# Patient Record
Sex: MALE | Race: ASIAN | Employment: FULL TIME | URBAN - METROPOLITAN AREA
[De-identification: names, ages, dates, MRNs, and addresses within clinical notes are randomized per-mention and may not be internally consistent; named-entity substitution may affect disease eponyms.]

---

## 2017-01-03 ENCOUNTER — TELEPHONE (OUTPATIENT)
Dept: FAMILY MEDICINE CLINIC | Facility: CLINIC | Age: 37
End: 2017-01-03

## 2017-01-03 ENCOUNTER — MED REC SCAN ONLY (OUTPATIENT)
Dept: FAMILY MEDICINE CLINIC | Facility: CLINIC | Age: 37
End: 2017-01-03

## 2017-05-17 ENCOUNTER — TELEPHONE (OUTPATIENT)
Dept: FAMILY MEDICINE CLINIC | Facility: CLINIC | Age: 37
End: 2017-05-17

## 2017-05-17 NOTE — TELEPHONE ENCOUNTER
Patient wants to know who we recommend for spinal doctor - he has a spine and neck issue. Please advise. Patient would like a call back today.

## 2017-05-17 NOTE — TELEPHONE ENCOUNTER
LOV 12/16 Shoulder pain ,neck arthritis x 3 days. Back pain since Friday says that has been pretty bad. Advised to continue heat or ice. Take OTC pain Rx  until seen by PCP.      Future Appointments  Date Time Provider Saurabh Wade   5/18/2017 12:30 PM

## 2017-05-18 ENCOUNTER — OFFICE VISIT (OUTPATIENT)
Dept: FAMILY MEDICINE CLINIC | Facility: CLINIC | Age: 37
End: 2017-05-18

## 2017-05-18 ENCOUNTER — TELEPHONE (OUTPATIENT)
Dept: FAMILY MEDICINE CLINIC | Facility: CLINIC | Age: 37
End: 2017-05-18

## 2017-05-18 VITALS
HEART RATE: 56 BPM | RESPIRATION RATE: 20 BRPM | DIASTOLIC BLOOD PRESSURE: 78 MMHG | TEMPERATURE: 99 F | OXYGEN SATURATION: 99 % | SYSTOLIC BLOOD PRESSURE: 118 MMHG | BODY MASS INDEX: 24.7 KG/M2 | HEIGHT: 67.5 IN | WEIGHT: 159.25 LBS

## 2017-05-18 DIAGNOSIS — M62.838 MUSCLE SPASMS OF NECK: ICD-10-CM

## 2017-05-18 DIAGNOSIS — M62.830 SPASM OF MUSCLE, BACK: ICD-10-CM

## 2017-05-18 DIAGNOSIS — M54.2 NECK PAIN: Primary | ICD-10-CM

## 2017-05-18 DIAGNOSIS — M50.30 DEGENERATIVE DISC DISEASE, CERVICAL: ICD-10-CM

## 2017-05-18 PROCEDURE — 99213 OFFICE O/P EST LOW 20 MIN: CPT | Performed by: FAMILY MEDICINE

## 2017-05-18 RX ORDER — NAPROXEN 500 MG/1
500 TABLET ORAL 2 TIMES DAILY WITH MEALS
Qty: 30 TABLET | Refills: 0 | Status: SHIPPED | OUTPATIENT
Start: 2017-05-18 | End: 2017-06-02

## 2017-05-18 RX ORDER — CYCLOBENZAPRINE HCL 10 MG
10 TABLET ORAL NIGHTLY
Qty: 15 TABLET | Refills: 0 | Status: SHIPPED | OUTPATIENT
Start: 2017-05-18 | End: 2017-06-02

## 2017-05-18 NOTE — TELEPHONE ENCOUNTER
Patient's wife called at 8:39 to cancel his appointment for today at 12:30. This will count as a No Show due to late cancellation. First No Show this year.

## 2017-05-18 NOTE — PROGRESS NOTES
Mekhi Matthew is a 31year old male. Patient presents with:  Pain: Back,shoulder and neck pain. Started in back then pain started in shoulder and neck pain.     HPI:   Patient complaining of pain in the neck and right shoulder area for the past few days, p PLAN:   Hortensia Hollingsworth was seen today for pain. Diagnoses and all orders for this visit:    Neck pain  -     Physical Therapy Referral - [facility]  -     naproxen 500 MG Oral Tab; Take 1 tablet (500 mg total) by mouth 2 (two) times daily with meals.     Renay Dawn

## 2017-05-18 NOTE — PATIENT INSTRUCTIONS
Shoulder and Upper Back Stretch  To start, stand tall with your ears, shoulders, and hips in line. Your feet should be slightly apart, positioned just under your hips. Focus your eyes directly in front of you.   this position for a few seconds bef To start, lie on your back, knees bent and feet flat on the floor. Keep your ears, shoulders, and hips aligned, but don’t press your lower back to the floor. Rest your hands on your pelvis. Breathe deeply and relax.   · With your neck relaxed, place t

## 2017-05-22 ENCOUNTER — TELEPHONE (OUTPATIENT)
Dept: FAMILY MEDICINE CLINIC | Facility: CLINIC | Age: 37
End: 2017-05-22

## 2017-05-22 ENCOUNTER — MED REC SCAN ONLY (OUTPATIENT)
Dept: FAMILY MEDICINE CLINIC | Facility: CLINIC | Age: 37
End: 2017-05-22

## 2017-05-22 NOTE — TELEPHONE ENCOUNTER
Received fax from [facility] of patient's initial evaluation. Asked to review, sign, and return. Placed on Dr Rao Wholesaleileen.

## 2017-06-17 ENCOUNTER — OFFICE VISIT (OUTPATIENT)
Dept: FAMILY MEDICINE CLINIC | Facility: CLINIC | Age: 37
End: 2017-06-17

## 2017-06-17 VITALS
OXYGEN SATURATION: 98 % | HEART RATE: 66 BPM | HEIGHT: 67.5 IN | WEIGHT: 159 LBS | RESPIRATION RATE: 18 BRPM | TEMPERATURE: 98 F | DIASTOLIC BLOOD PRESSURE: 68 MMHG | BODY MASS INDEX: 24.66 KG/M2 | SYSTOLIC BLOOD PRESSURE: 112 MMHG

## 2017-06-17 DIAGNOSIS — M50.30 DEGENERATIVE DISC DISEASE, CERVICAL: Primary | ICD-10-CM

## 2017-06-17 DIAGNOSIS — M79.2 RADICULAR PAIN IN RIGHT ARM: ICD-10-CM

## 2017-06-17 PROCEDURE — 99213 OFFICE O/P EST LOW 20 MIN: CPT | Performed by: FAMILY MEDICINE

## 2017-06-17 NOTE — PATIENT INSTRUCTIONS
Neck Problems: Relieving Your Symptoms  The first goal of treatment is to relieve your symptoms. Your healthcare provider may recommend self-care treatments. These include resting, applying ice and heat, taking medicine, and doing exercises.  Your healthc · Joint mobilization. The PT gently moves your vertebrae to help restore motion in your neck joints and reduce neck pain. · Soft tissue mobilization. The PT massages and stretches the muscles in your neck and shoulders.   · Electrical stimulation. Electric

## 2017-06-17 NOTE — PROGRESS NOTES
Ida Daugherty is a 31year old male. Patient presents with: Follow - Up: Pt states he is feeling much better from the back pain, bottom of head feels numb as times and numbness in hands as well.     HPI:   Patient is seen for follow-up of pain in the neck paraspinal muscles, normal ROM.   LUNGS: clear to auscultation  CARDIO: RRR without murmur  NEURO: Bilateral upper extremities normal sensation, strength is 5 x 5 bilateral, DTRs 2+ and symmetrical bilateral.    ASSESSMENT AND PLAN:   Sarai Muro was seen today f

## 2017-06-26 ENCOUNTER — OFFICE VISIT (OUTPATIENT)
Dept: SURGERY | Facility: CLINIC | Age: 37
End: 2017-06-26

## 2017-06-26 VITALS — DIASTOLIC BLOOD PRESSURE: 60 MMHG | HEART RATE: 64 BPM | SYSTOLIC BLOOD PRESSURE: 100 MMHG

## 2017-06-26 DIAGNOSIS — M50.30 DDD (DEGENERATIVE DISC DISEASE), CERVICAL: ICD-10-CM

## 2017-06-26 DIAGNOSIS — M54.2 CERVICALGIA OF OCCIPITO-ATLANTO-AXIAL REGION: ICD-10-CM

## 2017-06-26 DIAGNOSIS — M54.12 CERVICAL RADICULOPATHY: Primary | ICD-10-CM

## 2017-06-26 PROCEDURE — 99204 OFFICE O/P NEW MOD 45 MIN: CPT | Performed by: NURSE PRACTITIONER

## 2017-06-26 NOTE — H&P
NEUROSURGERY CLINIC VISIT    Ida Daugherty  12/21/1980      Patient presents with:  Neck Pain: NP referred by Dr. Shayy Bray        HPI:   Ida Daugherty is a 31year old ri 100/60 (BP Location: Right arm, Patient Position: Sitting, Cuff Size: adult)   Pulse 64   GENERAL:  Patient is in no acute distress. HEENT:  Normocephalic, atraumatic  SKIN: Warm, dry, no rashes or scars.   RESPIRATORY: easy and even  NEUROLOGICAL:  This p diagnosis, treatment options, and expectations . Recommend he get MRI is over the past 2 weeks he has new onset of symptoms to the right hand, middle, and ring finger.   Advised him we will get this MRI and discuss at his next office visit. surgical option

## 2017-06-26 NOTE — PATIENT INSTRUCTIONS
Refill policies:    • Allow 2-3 business days for refills; controlled substances may take longer.   • Contact your pharmacy at least 5 days prior to running out of medication and have them send an electronic request or submit request through the [facility] have a procedure or additional testing performed. [facility]) will contact your insurance carrier to obtain pre-certification or prior authorization.     Unfortunately, [employer] has seen an increase in denial of payment even though the p

## 2017-06-26 NOTE — PROGRESS NOTES
Location of Pain: neck, shoulder, radiating into right arm with numbness and tingling in right ring finger as well in the palm of the hand.     Date Pain Began: neck pain yrs, radiating into hand 1 month          Work Related:   No        Receiving Work Com

## 2017-07-06 ENCOUNTER — TELEPHONE (OUTPATIENT)
Dept: SURGERY | Facility: CLINIC | Age: 37
End: 2017-07-06

## 2017-07-07 ENCOUNTER — TELEPHONE (OUTPATIENT)
Dept: SURGERY | Facility: CLINIC | Age: 37
End: 2017-07-07

## 2017-07-07 NOTE — TELEPHONE ENCOUNTER
Patient states he never received call from our office stating MRI had been approved so he has not completed study. His symptoms have improved since last visit and he is wondering if he still needs MRI done.  Informed him that we forgot to place order for MR

## 2017-07-10 ENCOUNTER — TELEPHONE (OUTPATIENT)
Dept: SURGERY | Facility: CLINIC | Age: 37
End: 2017-07-10

## 2017-07-10 ENCOUNTER — OFFICE VISIT (OUTPATIENT)
Dept: SURGERY | Facility: CLINIC | Age: 37
End: 2017-07-10

## 2017-07-10 VITALS
RESPIRATION RATE: 12 BRPM | WEIGHT: 159 LBS | HEART RATE: 68 BPM | DIASTOLIC BLOOD PRESSURE: 68 MMHG | BODY MASS INDEX: 25 KG/M2 | SYSTOLIC BLOOD PRESSURE: 112 MMHG

## 2017-07-10 DIAGNOSIS — M50.30 DDD (DEGENERATIVE DISC DISEASE), CERVICAL: ICD-10-CM

## 2017-07-10 DIAGNOSIS — M54.12 CERVICAL RADICULOPATHY: Primary | ICD-10-CM

## 2017-07-10 PROCEDURE — 99213 OFFICE O/P EST LOW 20 MIN: CPT | Performed by: NURSE PRACTITIONER

## 2017-07-10 NOTE — PATIENT INSTRUCTIONS
Refill policies:    • Allow 2-3 business days for refills; controlled substances may take longer.   • Contact your pharmacy at least 5 days prior to running out of medication and have them send an electronic request or submit request through the [facility] have a procedure or additional testing performed. [facility]) will contact your insurance carrier to obtain pre-certification or prior authorization.     Unfortunately, [employer] has seen an increase in denial of payment even though the p

## 2017-07-10 NOTE — PROGRESS NOTES
Neurosurgery Cervical  Follow up      [facility] PCP:  Evita White MD    1980 MRN [id number]         Patient presents with:  Neck Pain    REASON FOR VISIT: f/u    HISTORY OF PRESENT Ga Gaby is a 31year oldmale Here for f/ MRI ordered for him to have done if his pain worsens. Advised him that if he has weakness or worsening pain to call our office in follow-up     Imaging and anatomy were reviewed with the patient and explained in detail.  Dr Melvi Alcala discussed diagnosis, treat

## 2017-07-10 NOTE — TELEPHONE ENCOUNTER
This patient came in today for office visit and was asking about his films that we had brought down to radiology to scan. He has not yet received these films could you call radiology and see if they have mailed them to him.

## 2017-12-15 ENCOUNTER — TELEPHONE (OUTPATIENT)
Dept: FAMILY MEDICINE CLINIC | Facility: CLINIC | Age: 37
End: 2017-12-15

## 2017-12-15 NOTE — TELEPHONE ENCOUNTER
Spoke with pt, he was seeing Dr Deysi Azevedo (neuro) and neck was improving    Pt just wanted to see PT, have the report sent to Dr Scott Mackay sign off on.     Explained that Dr would need to see him first    Gave him # to Dr Rose Valenzuela also    Pt will see if Dr Deysi Azevedo w

## 2017-12-15 NOTE — TELEPHONE ENCOUNTER
Has neck pain and [facility] going to [facility]. Also requested a physical before the end of the year. [facility] not [facility] here and pt declined physical mid-January. [employer] but plan has changed.   Pt believes it is still

## 2017-12-22 ENCOUNTER — TELEPHONE (OUTPATIENT)
Dept: SURGERY | Facility: CLINIC | Age: 37
End: 2017-12-22

## 2017-12-22 NOTE — TELEPHONE ENCOUNTER
Spoke with patient, informed him that he would need to make appointment with another provider to get examined prior to ordering any PT.  Patient wants to know if order can be based on last visit notes, I told him it has been over 5 months and things can [place]

## 2018-04-26 ENCOUNTER — OFFICE VISIT (OUTPATIENT)
Dept: FAMILY MEDICINE CLINIC | Facility: CLINIC | Age: 38
End: 2018-04-26

## 2018-04-26 ENCOUNTER — TELEPHONE (OUTPATIENT)
Dept: FAMILY MEDICINE CLINIC | Facility: CLINIC | Age: 38
End: 2018-04-26

## 2018-04-26 VITALS
WEIGHT: 150.5 LBS | HEART RATE: 54 BPM | RESPIRATION RATE: 14 BRPM | DIASTOLIC BLOOD PRESSURE: 58 MMHG | OXYGEN SATURATION: 98 % | BODY MASS INDEX: 23.35 KG/M2 | HEIGHT: 67.5 IN | SYSTOLIC BLOOD PRESSURE: 112 MMHG | TEMPERATURE: 99 F

## 2018-04-26 DIAGNOSIS — M54.12 CERVICAL RADICULOPATHY: ICD-10-CM

## 2018-04-26 DIAGNOSIS — M50.30 DEGENERATIVE DISC DISEASE, CERVICAL: ICD-10-CM

## 2018-04-26 DIAGNOSIS — L40.9 PSORIASIS OF SCALP: ICD-10-CM

## 2018-04-26 DIAGNOSIS — Z00.00 ROUTINE GENERAL MEDICAL EXAMINATION AT A HEALTH CARE FACILITY: Primary | ICD-10-CM

## 2018-04-26 DIAGNOSIS — L85.3 XEROSIS OF SKIN: ICD-10-CM

## 2018-04-26 PROCEDURE — 99395 PREV VISIT EST AGE 18-39: CPT | Performed by: FAMILY MEDICINE

## 2018-04-26 PROCEDURE — 99213 OFFICE O/P EST LOW 20 MIN: CPT | Performed by: FAMILY MEDICINE

## 2018-04-26 NOTE — PATIENT INSTRUCTIONS
Please apply hydrocortisone 1% cream on your ear twice daily for dryness. Prevention Guidelines, Men Ages 25 to 44  Screening tests and vaccines are an important part of managing your health. Health counseling is essential, too.  Below are guidelines for your healthcare provider 3 doses over 6 months; second dose should be given 1 month after the first dose; the third dose should be given at least 2 months after the second dose and at least 4 months after the first dose   Haemophilus influenzae Type B (HIB   [address], [place], [address]. All rights reserved. This information is not intended as a substitute for professional medical care. Always follow your healthcare professional's instructions.

## 2018-04-26 NOTE — PROGRESS NOTES
Silvia Fournier is a 30year old male here for Patient presents with: Well Adult: Physical.    HPI:   Patient is seen for a physical    Patient states is following up with Dr. Melvi Alcala, neurosurgeon. States he is no longer with the practice.   Was advised phys cough, shortness of breath, dyspnea on exertion, wheezing, hemoptysis. Cardiovascular: No heart palpitations, irregular heartbeat, faintness or syncope, no chest pressure or chest pain on exertion. No edema or varicose veins.   GI: No change in appetite, h Extremities:   Extremities normal, atraumatic, no cyanosis or edema   Pulses:   2+ and symmetric all extremities   Skin:    Male pattern baldness, dry patches with flakes on scalp   Lymph nodes:   Cervical, supraclavicular, and axillary nodes normal   Ne

## 2018-04-27 RX ORDER — CLOBETASOL PROPIONATE 0.5 MG/G
1 AEROSOL, FOAM TOPICAL 2 TIMES DAILY
Qty: 100 G | Refills: 0 | Status: SHIPPED | OUTPATIENT
Start: 2018-04-27 | End: 2018-05-27

## 2018-10-31 ENCOUNTER — OFFICE VISIT (OUTPATIENT)
Dept: FAMILY MEDICINE CLINIC | Facility: CLINIC | Age: 38
End: 2018-10-31
Payer: COMMERCIAL

## 2018-10-31 VITALS
SYSTOLIC BLOOD PRESSURE: 116 MMHG | WEIGHT: 148.38 LBS | DIASTOLIC BLOOD PRESSURE: 70 MMHG | BODY MASS INDEX: 23.02 KG/M2 | RESPIRATION RATE: 16 BRPM | TEMPERATURE: 97 F | HEIGHT: 67.5 IN | OXYGEN SATURATION: 98 % | HEART RATE: 68 BPM

## 2018-10-31 DIAGNOSIS — B07.0 PLANTAR WART OF RIGHT FOOT: Primary | ICD-10-CM

## 2018-10-31 DIAGNOSIS — M50.30 DEGENERATIVE DISC DISEASE, CERVICAL: ICD-10-CM

## 2018-10-31 DIAGNOSIS — M54.2 CERVICALGIA: ICD-10-CM

## 2018-10-31 PROCEDURE — 99213 OFFICE O/P EST LOW 20 MIN: CPT | Performed by: FAMILY MEDICINE

## 2018-10-31 NOTE — PROGRESS NOTES
Anmol John is a 30year old male. Patient presents with: Foot Pain: Pt here for callus on right foot not getting better.   Neck Pain: States his neck patient is coming back would like another order for PT    HPI:   Patient complaining of painful callus REHAB

## 2018-10-31 NOTE — PATIENT INSTRUCTIONS
Neck Problems: Relieving Your Symptoms    The first goal of treatment is to relieve your symptoms. Your healthcare provider may recommend self-care treatments. These include resting, applying ice and heat, taking medicine, and doing exercises.  Your healt your vertebrae to help restore motion in your neck joints and reduce neck pain. · Soft tissue mobilization. The PT massages and stretches the muscles in your neck and shoulders. · Electrical stimulation. Electrical impulses are sent into your neck.  This

## 2018-11-01 ENCOUNTER — TELEPHONE (OUTPATIENT)
Dept: FAMILY MEDICINE CLINIC | Facility: CLINIC | Age: 38
End: 2018-11-01

## 2018-11-01 NOTE — TELEPHONE ENCOUNTER
PODIATRIST MARCIA OrozcoM none none     Would like to see Dr Katerin Zapata vs. this Dr/specialist. ??  Unsure why. Sounded like a clinical discussion. Pls Call.

## 2018-11-02 ENCOUNTER — PATIENT MESSAGE (OUTPATIENT)
Dept: FAMILY MEDICINE CLINIC | Facility: CLINIC | Age: 38
End: 2018-11-02

## 2018-11-02 NOTE — TELEPHONE ENCOUNTER
From: Mo Tan  To: Laron Hylton MD  Sent: 11/2/2018 1:05 PM CDT  Subject: Visit Follow-up Question    Hi Doc,     I checked on the copay for specialist it is almost twice than the pcp.  Was wanting to check if you will take up my case for the fo

## 2018-12-17 ENCOUNTER — OFFICE VISIT (OUTPATIENT)
Dept: FAMILY MEDICINE CLINIC | Facility: CLINIC | Age: 38
End: 2018-12-17
Payer: COMMERCIAL

## 2018-12-17 VITALS
HEART RATE: 74 BPM | DIASTOLIC BLOOD PRESSURE: 86 MMHG | SYSTOLIC BLOOD PRESSURE: 136 MMHG | OXYGEN SATURATION: 98 % | WEIGHT: 150.5 LBS | BODY MASS INDEX: 23 KG/M2 | TEMPERATURE: 97 F | RESPIRATION RATE: 16 BRPM

## 2018-12-17 DIAGNOSIS — B07.0 PLANTAR WART OF RIGHT FOOT: Primary | ICD-10-CM

## 2018-12-17 PROCEDURE — 99212 OFFICE O/P EST SF 10 MIN: CPT | Performed by: FAMILY MEDICINE

## 2018-12-17 NOTE — PATIENT INSTRUCTIONS
Understanding Plantar Warts    A plantar wart is a small noncancerous growth on the bottom of the foot. Plantar warts often develop where friction or pressure occurs, such as on the ball of the foot. The word plantar refers to the sole of the foot.  Inessa these other treatments.    When should I call my healthcare provider? Call your healthcare provider if you have plantar warts that become too painful and do not go away on their own or with over-the-counter and at home treatments.    Date Last Reviewed: 3/

## 2018-12-24 NOTE — PROGRESS NOTES
Dominik Diallo is a 41year old male. Patient presents with: Follow - Up: Pt here for f/u of wart on right foot has been using a medication that was given in [facility]    HPI:   Patient is seen for follow-up of her right foot plantar wart.   States was visitin

## 2019-06-11 ENCOUNTER — HOSPITAL ENCOUNTER (OUTPATIENT)
Dept: PHYSICAL THERAPY | Facility: HOSPITAL | Age: 39
Setting detail: THERAPIES SERIES
Discharge: HOME | End: 2019-06-11
Attending: FAMILY MEDICINE
Payer: COMMERCIAL

## 2019-06-11 PROCEDURE — 97110 THERAPEUTIC EXERCISES: CPT

## 2019-06-11 PROCEDURE — 97161 PT EVAL LOW COMPLEX 20 MIN: CPT

## 2019-06-12 NOTE — PROGRESS NOTES
SPINE EVALUATION:   Referring Physician: Dr. Doyle Siu  Diagnosis: cervical DDD, neck pain     Date of Service: 6/12/2019     PATIENT SUMMARY   Ita Juarez is a 41year old male who presents to therapy today with complaints of bilateral neck pain describ discussed evaluation findings, pathology, POC and HEP. Pt voiced understanding and performs HEP correctly without reported pain. Skilled Physical Therapy is medically necessary to address the above impairments and reach functional goals.      Precautions: safely within 4 weeks. Patient will tolerate sitting at computer for 2 hours with no increase in pain within 4 weeks. Patient will achieve TD in FOTO score within 4 weeks in order to improve functional mobility.     Frequency / Duration: Patient will be

## 2019-06-14 ENCOUNTER — HOSPITAL ENCOUNTER (OUTPATIENT)
Dept: PHYSICAL THERAPY | Facility: HOSPITAL | Age: 39
Setting detail: THERAPIES SERIES
Discharge: HOME | End: 2019-06-14
Attending: FAMILY MEDICINE
Payer: COMMERCIAL

## 2019-06-14 ENCOUNTER — TELEPHONE (OUTPATIENT)
Dept: FAMILY MEDICINE CLINIC | Facility: CLINIC | Age: 39
End: 2019-06-14

## 2019-06-14 PROCEDURE — 97140 MANUAL THERAPY 1/> REGIONS: CPT

## 2019-06-14 PROCEDURE — 97110 THERAPEUTIC EXERCISES: CPT

## 2019-06-14 NOTE — PROGRESS NOTES
Dx: cervical DDD, neck pain           Authorized # of Visits:  [id number]         Next MD visit: none scheduled  Fall Risk: standard         Precautions: n/a             Subjective: patient reports that his symptoms are about the same.  He did his exercises and had

## 2019-06-14 NOTE — TELEPHONE ENCOUNTER
Pt requesting to sp w/  has some questions after having his 1st physical therapy appt, informed  out of office until 6/24 he said ok for her to call when she returns

## 2019-06-18 ENCOUNTER — HOSPITAL ENCOUNTER (OUTPATIENT)
Dept: PHYSICAL THERAPY | Facility: HOSPITAL | Age: 39
Setting detail: THERAPIES SERIES
Discharge: HOME | End: 2019-06-18
Attending: FAMILY MEDICINE
Payer: COMMERCIAL

## 2019-06-18 PROCEDURE — 97110 THERAPEUTIC EXERCISES: CPT

## 2019-06-18 PROCEDURE — 97140 MANUAL THERAPY 1/> REGIONS: CPT

## 2019-06-18 NOTE — PROGRESS NOTES
Dx: cervical DDD, neck pain           Authorized # of Visits:  [id number]         Next MD visit: none scheduled  Fall Risk: standard         Precautions: n/a             Subjective: patient reports that his neck is feeling better since previous session.  Still comp

## 2019-06-21 ENCOUNTER — HOSPITAL ENCOUNTER (OUTPATIENT)
Dept: PHYSICAL THERAPY | Facility: HOSPITAL | Age: 39
Setting detail: THERAPIES SERIES
Discharge: HOME | End: 2019-06-21
Attending: FAMILY MEDICINE
Payer: COMMERCIAL

## 2019-06-21 PROCEDURE — 97140 MANUAL THERAPY 1/> REGIONS: CPT

## 2019-06-21 PROCEDURE — 97110 THERAPEUTIC EXERCISES: CPT

## 2019-06-21 NOTE — PROGRESS NOTES
Dx: cervical DDD, neck pain           Authorized # of Visits:  [id number]         Next MD visit: none scheduled  Fall Risk: standard         Precautions: n/a             Subjective: patient reports that his neck is feeling better since previous session.  No pain wi

## 2019-06-25 ENCOUNTER — APPOINTMENT (OUTPATIENT)
Dept: PHYSICAL THERAPY | Facility: HOSPITAL | Age: 39
End: 2019-06-25
Attending: FAMILY MEDICINE
Payer: COMMERCIAL

## 2019-06-28 ENCOUNTER — APPOINTMENT (OUTPATIENT)
Dept: PHYSICAL THERAPY | Facility: HOSPITAL | Age: 39
End: 2019-06-28
Attending: FAMILY MEDICINE
Payer: COMMERCIAL

## 2019-07-02 ENCOUNTER — APPOINTMENT (OUTPATIENT)
Dept: PHYSICAL THERAPY | Facility: HOSPITAL | Age: 39
End: 2019-07-02
Attending: FAMILY MEDICINE
Payer: COMMERCIAL

## 2019-07-05 ENCOUNTER — APPOINTMENT (OUTPATIENT)
Dept: PHYSICAL THERAPY | Facility: HOSPITAL | Age: 39
End: 2019-07-05
Attending: FAMILY MEDICINE
Payer: COMMERCIAL

## 2019-07-17 ENCOUNTER — PATIENT MESSAGE (OUTPATIENT)
Dept: FAMILY MEDICINE CLINIC | Facility: CLINIC | Age: 39
End: 2019-07-17

## 2019-07-18 NOTE — TELEPHONE ENCOUNTER
From: Georgina Proctor  To: Alba Moseley MD  Sent: 7/17/2019 7:00 PM CDT  Subject: Visit Follow-up Question    Hello Doctor,    I had some questions from my physical therapy sessions. What is the best way to reach out to you?     Holger Heard

## 2019-07-18 NOTE — TELEPHONE ENCOUNTER
Dr Hu Fields, please see pt's response to my request for an appt    Here is the documentation from June when you were out of the office. The pt never returned our call to get more info.   Dai Potter      6/14/19 1:21 PM   Note      Pt requesting to s

## 2019-07-19 NOTE — TELEPHONE ENCOUNTER
Called and spoke to patient, advised billing issues will have to be addressed with the billing department and PT.

## 2019-08-20 ENCOUNTER — OFFICE VISIT (OUTPATIENT)
Dept: FAMILY MEDICINE CLINIC | Facility: CLINIC | Age: 39
End: 2019-08-20
Payer: COMMERCIAL

## 2019-08-20 VITALS
BODY MASS INDEX: 23.66 KG/M2 | WEIGHT: 152.5 LBS | SYSTOLIC BLOOD PRESSURE: 122 MMHG | TEMPERATURE: 98 F | OXYGEN SATURATION: 99 % | RESPIRATION RATE: 16 BRPM | HEIGHT: 67.5 IN | DIASTOLIC BLOOD PRESSURE: 84 MMHG | HEART RATE: 54 BPM

## 2019-08-20 DIAGNOSIS — Z00.00 ROUTINE GENERAL MEDICAL EXAMINATION AT A HEALTH CARE FACILITY: Primary | ICD-10-CM

## 2019-08-20 DIAGNOSIS — Z78.9 VEGETARIAN DIET: ICD-10-CM

## 2019-08-20 PROCEDURE — 99395 PREV VISIT EST AGE 18-39: CPT | Performed by: FAMILY MEDICINE

## 2019-08-20 NOTE — PROGRESS NOTES
Yenni Pereyra is a 41year old male here for     HPI:   Patient is seen for annual physical.    Chronic neck pain is better now but flares up at times, states physical therapy does help and has been doing the exercises at home.     PAST MEDICAL HISTORY: with urination. Male: No erectile dysfunction or low libido. No testicular or scrotal swelling or tenderness. No nocturia or difficulty with urine stream.  Rheumatologic: No history of autoimmune disorder.  Has chronic neck pain and arthritis in the neck Throughout, normal speech, alert and oriented       ASSESSMENT AND PLAN:   Diagnoses and all orders for this visit:    Routine general medical examination at a health care facility  -     ASSAY, THYROID STIM HORMONE; Future  -     LIPID PANEL;  Future

## 2019-08-20 NOTE — PATIENT INSTRUCTIONS
Prevention Guidelines, Men Ages 25 to 44  Screening tests and vaccines are an important part of managing your health. A screening test is done to find possible disorders or diseases in people who don't have any symptoms.  The goal is to find a disease ear needs it How often   Chickenpox (varicella) All men in this age group who have no record of this infection or vaccine 2 doses; the second dose should be given at least 4 weeks after the first dose   Hepatitis A Men at increased risk for infection – talk wi sexually active At routine exams   Skin cancer Prevention of skin cancer in fair-skinned adults through age 25 At routine exams   1Those who are 25years of age, who are not up-to-date on their childhood immunizations, should receive all appropriate catch-

## 2019-08-22 ENCOUNTER — PATIENT MESSAGE (OUTPATIENT)
Dept: FAMILY MEDICINE CLINIC | Facility: CLINIC | Age: 39
End: 2019-08-22

## 2019-08-22 DIAGNOSIS — M50.30 DEGENERATIVE DISC DISEASE, CERVICAL: Primary | ICD-10-CM

## 2019-08-22 NOTE — TELEPHONE ENCOUNTER
Dr. Dana Jacob,  Please advise    LOV 8/20/19   Routine PE    PT referral order written 10/31/19 for cervical disc disease. Approved for 8 visits through 10/31/19. Started PT 6/12/19 and has been seen for 4 visits.

## 2019-08-22 NOTE — TELEPHONE ENCOUNTER
From: Tom Mclaughlin  To: Keyshawn Wall MD  Sent: 8/22/2019 1:35 PM CDT  Subject: Other    Hello Doctor,    I forgot to ask you to write a letter for physical therapy for my neck. Could you please write me one? I do know it expires in 90 days.     Thank

## 2019-08-26 LAB
ABSOLUTE BASOPHILS: 39 CELLS/UL (ref 0–200)
ABSOLUTE EOSINOPHILS: 193 CELLS/UL (ref 15–500)
ABSOLUTE LYMPHOCYTES: 1551 CELLS/UL (ref 850–3900)
ABSOLUTE MONOCYTES: 413 CELLS/UL (ref 200–950)
ABSOLUTE NEUTROPHILS: 3306 CELLS/UL (ref 1500–7800)
ALBUMIN/GLOBULIN RATIO: 1.9 (CALC) (ref 1–2.5)
ALBUMIN: 4.5 G/DL (ref 3.6–5.1)
ALKALINE PHOSPHATASE: 53 U/L (ref 40–115)
ALT: 17 U/L (ref 9–46)
AST: 20 U/L (ref 10–40)
BASOPHILS: 0.7 %
BILIRUBIN, TOTAL: 1.4 MG/DL (ref 0.2–1.2)
BUN: 9 MG/DL (ref 7–25)
CALCIUM: 9.6 MG/DL (ref 8.6–10.3)
CARBON DIOXIDE: 25 MMOL/L (ref 20–32)
CHLORIDE: 106 MMOL/L (ref 98–110)
CHOL/HDLC RATIO: 4 (CALC)
CHOLESTEROL, TOTAL: 204 MG/DL
CREATININE: 0.88 MG/DL (ref 0.6–1.35)
EGFR IF AFRICN AM: 128 ML/MIN/1.73M2
EGFR IF NONAFRICN AM: 111 ML/MIN/1.73M2
EOSINOPHILS: 3.5 %
GLOBULIN: 2.4 G/DL (CALC) (ref 1.9–3.7)
GLUCOSE: 86 MG/DL (ref 65–99)
HDL CHOLESTEROL: 51 MG/DL
HEMATOCRIT: 44.1 % (ref 38.5–50)
HEMOGLOBIN: 14.7 G/DL (ref 13.2–17.1)
LDL-CHOLESTEROL: 136 MG/DL (CALC)
LYMPHOCYTES: 28.2 %
MCH: 30 PG (ref 27–33)
MCHC: 33.3 G/DL (ref 32–36)
MCV: 90 FL (ref 80–100)
MONOCYTES: 7.5 %
MPV: 10.9 FL (ref 7.5–12.5)
NEUTROPHILS: 60.1 %
NON-HDL CHOLESTEROL: 153 MG/DL (CALC)
PLATELET COUNT: 221 THOUSAND/UL (ref 140–400)
POTASSIUM: 4.5 MMOL/L (ref 3.5–5.3)
PROTEIN, TOTAL: 6.9 G/DL (ref 6.1–8.1)
RDW: 12.4 % (ref 11–15)
RED BLOOD CELL COUNT: 4.9 MILLION/UL (ref 4.2–5.8)
SODIUM: 140 MMOL/L (ref 135–146)
TRIGLYCERIDES: 74 MG/DL
TSH: 1.96 MIU/L (ref 0.4–4.5)
VITAMIN B12: 264 PG/ML (ref 200–1100)
VITAMIN D, 25-OH, TOTAL: 11 NG/ML (ref 30–100)
WHITE BLOOD CELL COUNT: 5.5 THOUSAND/UL (ref 3.8–10.8)

## 2019-10-10 ENCOUNTER — MED REC SCAN ONLY (OUTPATIENT)
Dept: FAMILY MEDICINE CLINIC | Facility: CLINIC | Age: 39
End: 2019-10-10

## 2019-10-21 ENCOUNTER — PATIENT MESSAGE (OUTPATIENT)
Dept: FAMILY MEDICINE CLINIC | Facility: CLINIC | Age: 39
End: 2019-10-21

## 2019-10-22 NOTE — TELEPHONE ENCOUNTER
From: Danny Leggett  To: Bismark Lin MD  Sent: 10/21/2019 7:58 PM CDT  Subject: Prescription Question    Hello Doctor,    Can you please add a refill for vitamin d? I took it for 2 months and the prescription was for 3 months.     Thank you,  Leona Koo

## 2019-12-06 ENCOUNTER — PATIENT MESSAGE (OUTPATIENT)
Dept: FAMILY MEDICINE CLINIC | Facility: CLINIC | Age: 39
End: 2019-12-06

## 2019-12-09 NOTE — TELEPHONE ENCOUNTER
From: Alise Glenvil  To: Stephanie Patiño MD  Sent: 12/6/2019 5:03 PM CST  Subject: Non-Urgent Medical Question    Abigail Hua,    Since last 5 days I feel a little pressure in my head. More around the head right above the ears.  It feels like I've been

## 2019-12-09 NOTE — TELEPHONE ENCOUNTER
VMML for patient and instructed if symptoms acute, experiencing dizziness, nausea, visual changes or any other symptoms, he should go to ED. Advised to call back so we can get condition update and schedule appt as appropriate.

## 2020-07-22 ENCOUNTER — PATIENT MESSAGE (OUTPATIENT)
Dept: FAMILY MEDICINE CLINIC | Facility: CLINIC | Age: 40
End: 2020-07-22

## 2020-07-22 NOTE — TELEPHONE ENCOUNTER
From: Vangie Sigalaing  To: Shaka Sorto MD  Sent: 7/22/2020 4:00 PM CDT  Subject: Test Results Question    Hel Doctor,    I hope you are doing well.     Wanted to check with you if I can get a certificate of the TDAP vaccine I took in 2015 in your offi

## 2020-11-03 ENCOUNTER — OFFICE VISIT (OUTPATIENT)
Dept: FAMILY MEDICINE CLINIC | Facility: CLINIC | Age: 40
End: 2020-11-03
Payer: COMMERCIAL

## 2020-11-03 VITALS
DIASTOLIC BLOOD PRESSURE: 78 MMHG | BODY MASS INDEX: 24.98 KG/M2 | HEART RATE: 92 BPM | RESPIRATION RATE: 18 BRPM | WEIGHT: 161 LBS | OXYGEN SATURATION: 98 % | TEMPERATURE: 98 F | SYSTOLIC BLOOD PRESSURE: 102 MMHG | HEIGHT: 67.5 IN

## 2020-11-03 DIAGNOSIS — M79.646 PAIN OF MIDDLE FINGER: ICD-10-CM

## 2020-11-03 DIAGNOSIS — Z00.00 ROUTINE GENERAL MEDICAL EXAMINATION AT A HEALTH CARE FACILITY: Primary | ICD-10-CM

## 2020-11-03 DIAGNOSIS — L40.9 PSORIASIS: ICD-10-CM

## 2020-11-03 PROCEDURE — 3074F SYST BP LT 130 MM HG: CPT | Performed by: FAMILY MEDICINE

## 2020-11-03 PROCEDURE — 99395 PREV VISIT EST AGE 18-39: CPT | Performed by: FAMILY MEDICINE

## 2020-11-03 PROCEDURE — 3078F DIAST BP <80 MM HG: CPT | Performed by: FAMILY MEDICINE

## 2020-11-03 PROCEDURE — 3008F BODY MASS INDEX DOCD: CPT | Performed by: FAMILY MEDICINE

## 2020-11-03 NOTE — PATIENT INSTRUCTIONS
Prevention Guidelines, Men Ages 25 to 44  Screening tests and vaccines are an important part of managing your health. A screening test is done to find possible disorders or diseases in people who don't have any symptoms.  The goal is to find a disease ear vaccine 2 doses; the second dose should be given at least 4 weeks after the first dose   Hepatitis A Men at increased risk for infection – talk with your healthcare provider 2 doses given at least 6 months apart   Hepatitis B Men at increased risk for infe be reminded to avoid intentional tanning and tanning beds. 1Those who are 25years of age, who are not up-to-date on their childhood immunizations, should get all appropriate catch-up vaccines recommended by the CDC.    Duane last reviewed this educat

## 2020-11-03 NOTE — PROGRESS NOTES
Joel Guy is a 42year old male.   Patient presents with:  Physical    HPI:   Joel Guy is a 42year old male seen for his annual physical.  Eats healthy, is vegetarian    Patient states was taking some homeopathic medicine from [facility] for psoriasis Negative for congestion, ear pain, hearing loss and sore throat. Eyes: Negative for discharge, redness and visual disturbance. Respiratory: Negative for cough, chest tightness and shortness of breath.     Cardiovascular: Negative for chest pain and pal normal. He exhibits no distension and no mass. There is no hepatosplenomegaly. There is no abdominal tenderness. Musculoskeletal: Normal range of motion. He exhibits tenderness (3rd right MCP joint, pain with flexion and extension).    Lymphadenopathy:

## 2021-01-06 ENCOUNTER — PATIENT MESSAGE (OUTPATIENT)
Dept: FAMILY MEDICINE CLINIC | Facility: CLINIC | Age: 41
End: 2021-01-06

## 2021-01-06 DIAGNOSIS — Z78.9 VEGETARIAN DIET: Primary | ICD-10-CM

## 2021-01-06 DIAGNOSIS — E55.9 VITAMIN D DEFICIENCY: ICD-10-CM

## 2021-01-06 DIAGNOSIS — E53.8 VITAMIN B12 DEFICIENCY: ICD-10-CM

## 2021-01-07 NOTE — TELEPHONE ENCOUNTER
LOV 11/3/20  Last vitamin D and B12 8/2019    Ref Range & Units 8/24/19 10:10 AM   VITAMIN D, 25-OH, TOTAL   30 - 100 ng/mL 11Low       Ref Range & Units 8/24/19 10:10 AM   VITAMIN B12   200 - 1,100 pg/mL 264      Lab orders pended for your approval if ok.

## 2021-01-07 NOTE — TELEPHONE ENCOUNTER
From: Dominik Diallo  To: Afshan Rogel MD  Sent: 1/6/2021 11:42 AM CST  Subject: Visit Follow-up Question    Hello Doctor,    From my last annual physical visit, I forgot to ask for my Vitamin levels checked.   Could you please also write me for vitamin

## 2021-01-20 LAB
VITAMIN B12: 322 PG/ML (ref 200–1100)
VITAMIN D, 25-OH, TOTAL: 25 NG/ML (ref 30–100)

## 2021-01-22 ENCOUNTER — PATIENT MESSAGE (OUTPATIENT)
Dept: FAMILY MEDICINE CLINIC | Facility: CLINIC | Age: 41
End: 2021-01-22

## 2021-01-25 NOTE — TELEPHONE ENCOUNTER
From: Ida Daugherty  To: Jojo Vora MD  Sent: 1/22/2021 1:16 PM CST  Subject: Prescription Question    Hi Doctor,    Got your message on test results. Thank you. Will start taking 2000iu for D. I hope it's fine to take 2 tab of 1000iu.  If not then

## 2021-01-25 NOTE — TELEPHONE ENCOUNTER
Viewed by Jessika Potter on 1/22/2021  1:09 PM  Written by Chata Roldan MD on 1/21/2021  5:40 AM  Heath Rutherford,   You have vitamin D insufficiency, please continue to take over the counter vitamin D 2000iu daily.  Vitamin B12 looks normal, please continue to

## 2021-10-08 ENCOUNTER — OFFICE VISIT (OUTPATIENT)
Dept: FAMILY MEDICINE CLINIC | Facility: CLINIC | Age: 41
End: 2021-10-08
Payer: COMMERCIAL

## 2021-10-08 VITALS
WEIGHT: 157 LBS | RESPIRATION RATE: 18 BRPM | OXYGEN SATURATION: 98 % | DIASTOLIC BLOOD PRESSURE: 76 MMHG | TEMPERATURE: 97 F | BODY MASS INDEX: 24.35 KG/M2 | HEART RATE: 60 BPM | HEIGHT: 67.5 IN | SYSTOLIC BLOOD PRESSURE: 102 MMHG

## 2021-10-08 DIAGNOSIS — E53.8 VITAMIN B12 DEFICIENCY: ICD-10-CM

## 2021-10-08 DIAGNOSIS — Z00.00 ROUTINE GENERAL MEDICAL EXAMINATION AT A HEALTH CARE FACILITY: Primary | ICD-10-CM

## 2021-10-08 DIAGNOSIS — E55.9 VITAMIN D DEFICIENCY: ICD-10-CM

## 2021-10-08 PROCEDURE — 3074F SYST BP LT 130 MM HG: CPT | Performed by: FAMILY MEDICINE

## 2021-10-08 PROCEDURE — 3078F DIAST BP <80 MM HG: CPT | Performed by: FAMILY MEDICINE

## 2021-10-08 PROCEDURE — 3008F BODY MASS INDEX DOCD: CPT | Performed by: FAMILY MEDICINE

## 2021-10-08 PROCEDURE — 99395 PREV VISIT EST AGE 18-39: CPT | Performed by: FAMILY MEDICINE

## 2021-10-08 NOTE — PROGRESS NOTES
Shayy Schroeder is a 47year old male. Patient presents with:  Physical    HPI:   Shayy Schroeder is a 42year old male seen for his annual physical.  Eats healthy, is vegetarian    Patient states is eating healthier as they are trying to get pregnant.     PAS polyuria. Genitourinary: Negative for dysuria, urgency, frequency and difficulty urinating. Musculoskeletal: Positive for neck pain. Negative for myalgias, joint swelling, joint pain and gait problem. Skin: Negative for rash.    Allergic/Immunologic: warm. No rash noted. Psychiatric: He has a normal mood and affect.  His behavior is normal. Judgment and thought content normal.     ASSESSMENT AND PLAN   Shazia Kerr was seen today for physical.    Diagnoses and all orders for this visit:    Routine general me

## 2022-06-10 ENCOUNTER — OFFICE VISIT (OUTPATIENT)
Dept: FAMILY MEDICINE CLINIC | Facility: CLINIC | Age: 42
End: 2022-06-10
Payer: COMMERCIAL

## 2022-06-10 VITALS
HEART RATE: 70 BPM | HEIGHT: 67.5 IN | WEIGHT: 155 LBS | BODY MASS INDEX: 24.05 KG/M2 | RESPIRATION RATE: 18 BRPM | DIASTOLIC BLOOD PRESSURE: 80 MMHG | OXYGEN SATURATION: 98 % | TEMPERATURE: 98 F | SYSTOLIC BLOOD PRESSURE: 110 MMHG

## 2022-06-10 DIAGNOSIS — E55.9 VITAMIN D DEFICIENCY: ICD-10-CM

## 2022-06-10 DIAGNOSIS — Z00.00 ROUTINE GENERAL MEDICAL EXAMINATION AT A HEALTH CARE FACILITY: Primary | ICD-10-CM

## 2022-06-10 DIAGNOSIS — E53.8 VITAMIN B12 DEFICIENCY: ICD-10-CM

## 2022-06-10 PROCEDURE — 99395 PREV VISIT EST AGE 18-39: CPT | Performed by: FAMILY MEDICINE

## 2022-06-10 PROCEDURE — 3074F SYST BP LT 130 MM HG: CPT | Performed by: FAMILY MEDICINE

## 2022-06-10 PROCEDURE — 3079F DIAST BP 80-89 MM HG: CPT | Performed by: FAMILY MEDICINE

## 2022-06-10 PROCEDURE — 3008F BODY MASS INDEX DOCD: CPT | Performed by: FAMILY MEDICINE

## 2022-07-03 LAB
ABSOLUTE BASOPHILS: 50 CELLS/UL (ref 0–200)
ABSOLUTE EOSINOPHILS: 192 CELLS/UL (ref 15–500)
ABSOLUTE LYMPHOCYTES: 1612 CELLS/UL (ref 850–3900)
ABSOLUTE MONOCYTES: 508 CELLS/UL (ref 200–950)
ABSOLUTE NEUTROPHILS: 3838 CELLS/UL (ref 1500–7800)
ALBUMIN/GLOBULIN RATIO: 1.7 (CALC) (ref 1–2.5)
ALBUMIN: 4.4 G/DL (ref 3.6–5.1)
ALKALINE PHOSPHATASE: 54 U/L (ref 36–130)
ALT: 22 U/L (ref 9–46)
AST: 23 U/L (ref 10–40)
BASOPHILS: 0.8 %
BILIRUBIN, TOTAL: 1.7 MG/DL (ref 0.2–1.2)
BUN: 16 MG/DL (ref 7–25)
CALCIUM: 9.8 MG/DL (ref 8.6–10.3)
CARBON DIOXIDE: 30 MMOL/L (ref 20–32)
CHLORIDE: 104 MMOL/L (ref 98–110)
CHOL/HDLC RATIO: 3.9 (CALC)
CHOLESTEROL, TOTAL: 195 MG/DL
CREATININE: 0.88 MG/DL (ref 0.6–1.35)
EGFR IF AFRICN AM: 125 ML/MIN/1.73M2
EGFR IF NONAFRICN AM: 108 ML/MIN/1.73M2
EOSINOPHILS: 3.1 %
GLOBULIN: 2.6 G/DL (CALC) (ref 1.9–3.7)
GLUCOSE: 90 MG/DL (ref 65–99)
HDL CHOLESTEROL: 50 MG/DL
HEMATOCRIT: 45.4 % (ref 38.5–50)
HEMOGLOBIN: 15 G/DL (ref 13.2–17.1)
LDL-CHOLESTEROL: 124 MG/DL (CALC)
LYMPHOCYTES: 26 %
MCH: 29.9 PG (ref 27–33)
MCHC: 33 G/DL (ref 32–36)
MCV: 90.6 FL (ref 80–100)
MONOCYTES: 8.2 %
MPV: 11 FL (ref 7.5–12.5)
NEUTROPHILS: 61.9 %
NON-HDL CHOLESTEROL: 145 MG/DL (CALC)
PLATELET COUNT: 213 THOUSAND/UL (ref 140–400)
POTASSIUM: 4.1 MMOL/L (ref 3.5–5.3)
PROTEIN, TOTAL: 7 G/DL (ref 6.1–8.1)
RDW: 12.3 % (ref 11–15)
RED BLOOD CELL COUNT: 5.01 MILLION/UL (ref 4.2–5.8)
SODIUM: 138 MMOL/L (ref 135–146)
TRIGLYCERIDES: 106 MG/DL
TSH: 2.65 MIU/L (ref 0.4–4.5)
VITAMIN B12: 429 PG/ML (ref 200–1100)
VITAMIN D, 25-OH, TOTAL: 41 NG/ML (ref 30–100)
WHITE BLOOD CELL COUNT: 6.2 THOUSAND/UL (ref 3.8–10.8)

## 2023-03-07 ENCOUNTER — OFFICE VISIT (OUTPATIENT)
Dept: FAMILY MEDICINE CLINIC | Facility: CLINIC | Age: 43
End: 2023-03-07
Payer: COMMERCIAL

## 2023-03-07 VITALS
TEMPERATURE: 98 F | DIASTOLIC BLOOD PRESSURE: 78 MMHG | HEIGHT: 67.5 IN | SYSTOLIC BLOOD PRESSURE: 118 MMHG | OXYGEN SATURATION: 98 % | WEIGHT: 136 LBS | RESPIRATION RATE: 18 BRPM | HEART RATE: 72 BPM | BODY MASS INDEX: 21.1 KG/M2

## 2023-03-07 DIAGNOSIS — E55.9 VITAMIN D DEFICIENCY: ICD-10-CM

## 2023-03-07 DIAGNOSIS — E53.8 VITAMIN B12 DEFICIENCY: ICD-10-CM

## 2023-03-07 DIAGNOSIS — E60 ZINC DEFICIENCY: ICD-10-CM

## 2023-03-07 DIAGNOSIS — Z00.00 ROUTINE GENERAL MEDICAL EXAMINATION AT A HEALTH CARE FACILITY: Primary | ICD-10-CM

## 2023-03-07 PROCEDURE — 3008F BODY MASS INDEX DOCD: CPT | Performed by: FAMILY MEDICINE

## 2023-03-07 PROCEDURE — 3078F DIAST BP <80 MM HG: CPT | Performed by: FAMILY MEDICINE

## 2023-03-07 PROCEDURE — 99396 PREV VISIT EST AGE 40-64: CPT | Performed by: FAMILY MEDICINE

## 2023-03-07 PROCEDURE — 3074F SYST BP LT 130 MM HG: CPT | Performed by: FAMILY MEDICINE

## 2023-03-21 LAB
ABSOLUTE BASOPHILS: 48 CELLS/UL (ref 0–200)
ABSOLUTE EOSINOPHILS: 233 CELLS/UL (ref 15–500)
ABSOLUTE LYMPHOCYTES: 2099 CELLS/UL (ref 850–3900)
ABSOLUTE MONOCYTES: 413 CELLS/UL (ref 200–950)
ABSOLUTE NEUTROPHILS: 2507 CELLS/UL (ref 1500–7800)
ALBUMIN/GLOBULIN RATIO: 2 (CALC) (ref 1–2.5)
ALBUMIN: 4.6 G/DL (ref 3.6–5.1)
ALKALINE PHOSPHATASE: 55 U/L (ref 36–130)
ALT: 15 U/L (ref 9–46)
AST: 22 U/L (ref 10–40)
BASOPHILS: 0.9 %
BILIRUBIN, TOTAL: 1.9 MG/DL (ref 0.2–1.2)
BUN: 10 MG/DL (ref 7–25)
CALCIUM: 9.9 MG/DL (ref 8.6–10.3)
CARBON DIOXIDE: 31 MMOL/L (ref 20–32)
CHLORIDE: 105 MMOL/L (ref 98–110)
CHOL/HDLC RATIO: 3.2 (CALC)
CHOLESTEROL, TOTAL: 174 MG/DL
CREATININE: 0.91 MG/DL (ref 0.6–1.29)
EGFR: 109 ML/MIN/1.73M2
EOSINOPHILS: 4.4 %
GLOBULIN: 2.3 G/DL (CALC) (ref 1.9–3.7)
GLUCOSE: 91 MG/DL (ref 65–99)
HDL CHOLESTEROL: 55 MG/DL
HEMATOCRIT: 43 % (ref 38.5–50)
HEMOGLOBIN: 14.7 G/DL (ref 13.2–17.1)
LDL-CHOLESTEROL: 99 MG/DL (CALC)
LYMPHOCYTES: 39.6 %
MCH: 31.1 PG (ref 27–33)
MCHC: 34.2 G/DL (ref 32–36)
MCV: 91.1 FL (ref 80–100)
MONOCYTES: 7.8 %
MPV: 11.3 FL (ref 7.5–12.5)
NEUTROPHILS: 47.3 %
NON-HDL CHOLESTEROL: 119 MG/DL (CALC)
PLATELET COUNT: 201 THOUSAND/UL (ref 140–400)
POTASSIUM: 3.8 MMOL/L (ref 3.5–5.3)
PROTEIN, TOTAL: 6.9 G/DL (ref 6.1–8.1)
RDW: 12.4 % (ref 11–15)
RED BLOOD CELL COUNT: 4.72 MILLION/UL (ref 4.2–5.8)
SODIUM: 140 MMOL/L (ref 135–146)
TRIGLYCERIDES: 108 MG/DL
TSH: 5.23 MIU/L (ref 0.4–4.5)
VITAMIN B12: 328 PG/ML (ref 200–1100)
VITAMIN D, 25-OH, TOTAL: 33 NG/ML (ref 30–100)
WHITE BLOOD CELL COUNT: 5.3 THOUSAND/UL (ref 3.8–10.8)
ZINC: 68 MCG/DL (ref 60–130)

## 2023-03-25 ENCOUNTER — PATIENT MESSAGE (OUTPATIENT)
Dept: FAMILY MEDICINE CLINIC | Facility: CLINIC | Age: 43
End: 2023-03-25

## 2023-03-27 NOTE — TELEPHONE ENCOUNTER
From: Nohelia Ye  To: Caden Turcios MD  Sent: 3/25/2023 8:10 PM CDT  Subject: Abnormal Thyroid function    Hello Doctor,     Will be repeating the test in one month. Could you please write for full thyroid panel?     Thank you,   Anais Espinoza

## 2023-04-29 ENCOUNTER — PATIENT MESSAGE (OUTPATIENT)
Dept: FAMILY MEDICINE CLINIC | Facility: CLINIC | Age: 43
End: 2023-04-29

## 2023-05-02 NOTE — TELEPHONE ENCOUNTER
From: Rickey Nunn  To: Amita Cook MD  Sent: 4/29/2023 6:04 PM CDT  Subject: Question regarding THYROID PEROXIDASE AND THYROGLOBULIN ANTIBODIES    Roddy Trejo,    Thank you for the follow-up. Should I be concerned about thyroglobulin antibodies? Please let me know.      Luh Gonzalez

## 2023-06-28 ENCOUNTER — OFFICE VISIT (OUTPATIENT)
Dept: FAMILY MEDICINE CLINIC | Facility: CLINIC | Age: 43
End: 2023-06-28
Payer: COMMERCIAL

## 2023-06-28 VITALS
TEMPERATURE: 98 F | OXYGEN SATURATION: 98 % | DIASTOLIC BLOOD PRESSURE: 76 MMHG | WEIGHT: 137 LBS | HEIGHT: 67.5 IN | RESPIRATION RATE: 18 BRPM | BODY MASS INDEX: 21.25 KG/M2 | HEART RATE: 85 BPM | SYSTOLIC BLOOD PRESSURE: 118 MMHG

## 2023-06-28 DIAGNOSIS — J06.9 VIRAL UPPER RESPIRATORY TRACT INFECTION: Primary | ICD-10-CM

## 2023-06-28 DIAGNOSIS — K40.20 NON-RECURRENT BILATERAL INGUINAL HERNIA WITHOUT OBSTRUCTION OR GANGRENE: ICD-10-CM

## 2023-06-28 PROCEDURE — 3008F BODY MASS INDEX DOCD: CPT | Performed by: FAMILY MEDICINE

## 2023-06-28 PROCEDURE — 3074F SYST BP LT 130 MM HG: CPT | Performed by: FAMILY MEDICINE

## 2023-06-28 PROCEDURE — 3078F DIAST BP <80 MM HG: CPT | Performed by: FAMILY MEDICINE

## 2023-06-28 PROCEDURE — 99213 OFFICE O/P EST LOW 20 MIN: CPT | Performed by: FAMILY MEDICINE

## 2023-07-12 ENCOUNTER — OFFICE VISIT (OUTPATIENT)
Facility: LOCATION | Age: 43
End: 2023-07-12
Payer: COMMERCIAL

## 2023-07-12 VITALS — HEART RATE: 76 BPM | TEMPERATURE: 98 F

## 2023-07-12 DIAGNOSIS — K40.00 BILATERAL INCARCERATED INGUINAL HERNIA: Primary | ICD-10-CM

## 2023-07-12 PROCEDURE — 99244 OFF/OP CNSLTJ NEW/EST MOD 40: CPT | Performed by: SURGERY

## 2023-07-14 PROBLEM — K40.00 BILATERAL INCARCERATED INGUINAL HERNIA: Status: ACTIVE | Noted: 2023-07-14

## 2023-08-01 ENCOUNTER — PATIENT MESSAGE (OUTPATIENT)
Dept: FAMILY MEDICINE CLINIC | Facility: CLINIC | Age: 43
End: 2023-08-01

## 2023-08-01 DIAGNOSIS — Z13.1 SCREENING FOR DIABETES MELLITUS: Primary | ICD-10-CM

## 2023-08-02 NOTE — TELEPHONE ENCOUNTER
From: Kelsey Chavez  To: Jerome aDvid MD  Sent: 8/1/2023 9:24 PM CDT  Subject: Lab for HbA1C    Hello Dr,    Could you please order HbA1C for me? This is required by the other Dr Kaela Burt is seeing for treatment.      Thank you,   Demarcus Claudio

## 2023-08-12 LAB — HEMOGLOBIN A1C: 5 % OF TOTAL HGB

## 2023-11-01 NOTE — H&P
HPI:     Mike Louis is a 38year old male with a PMH of c-spine arthritis, b/l incarcerated hernia. He presents as a consult for:  1. Fertility testing   2. Discuss fam h/o CaP  - dad dx 76s  3. Mild ED    PCP - Mervat [place] Urologist - Chris Huddleston (Melisa Mccoy), 11/19/21, Christina Ahmadi 6/3/20    He will be undergoing b/l IHR with [facility], not yet scheduled. His dad was just diagnosed with prostate cancer. He and his partner have been trying to conceive for several years with unprotected intercourse. They have intercourse 6-7 times around time of ovulation. Wife got pregnant June 2023 either naturally or IUI but lost pregnancy. Got pregnant naturally in 2021 and lost pregnancy. Wife follows with Dr Brent Cardozo for fertility  They have undergone IUI (Oct and Nov 2021) and again summer 2023. He reports no prior children. Partner has been pregnant twice (2021 naturally, 2023 either naturally or via IUI) and lost both. His energy, appetite, and libido are good. He reports good erections most of the time but occasionally takes 2.5 mg cialis which helps. He denies unexplained weight loss, weight gain. He denies  trauma or surgeries. He  denies chronic illnesses. He denies excessive use of alcohol, tobacco, marijuana, opioids. He denies prior radiation therapy or chemotherapy, anabolic steroids, corticosteroids, pesticide exposure. No significant LUTS. Incontinence: none  Penoscrotal exam: normal penis, and normal testes with palpable vas bilaterally, no masses or tenderness. GIN: ~ 30 g, no nodules or tenderness    Unable to void today.     UTI hx: none  Gross hematuria: none  Tobacco hx: none  Kidney stone hx: none    Labs:  - 8/29/23: 1.4 mL, SC 14 mil/mL, motility 60.71%, TC 19.6 mil  - 5/31/23: 1.4 mL, SC 49 mil/mL, motility 51%, TC 68.6 mil  - 1/7/22: Vol 1.0 mL, SC 47.1 mil/mL, motility 49%, TC 47.1 mil  - 10 /11 /21 : Vol 1 mL, SD 10.1 M/mL, motility 54%, TMC 5.4 M  - 8/31/21 : Vol 1.5 ml, SD 36 M/ml, motility 51%, round cells 4+, morphology 13% TMC 27 M  10/2/20: Vol 3 ml, SD 26 M/ml, motility 94% Round cells 5+ Morphology 0%, TMC 68 M  Genetic Karyotypin,XY    T 474 22  FSH, LH, E were WNL    Discussed that he has had multiple semen analyses showing low-normal volume with good motility. I would suggest checking TSH/T4, prolactin, CBC, CMP and PTH to ensure no other metabolic abnormalities as well as scrotal US. Discussed rationale for these tests and he is agreeable. Discussed he currently has viable sperm and should be able to conceive natural or assisted methods from his standpoint but his partner needs to continue to work with her fertility specialist.    Discussed option to try clomid for sub-par semen parameters and reviewed SEs. He declines. He will check labs and scrotal US as noted above. Continue 2.5 mg cialis prn. Checking baseline PSA per patient preference given family history. HISTORY:  Past Medical History:   Diagnosis Date    Degenerative arthritis of cervical spine       History reviewed. No pertinent surgical history. Family History   Problem Relation Age of Onset    Diabetes Maternal Grandfather     Heart Disease Father       Social History:   Social History     Socioeconomic History    Marital status:    Occupational History    Occupation: IT   Tobacco Use    Smoking status: Never    Smokeless tobacco: Never   Substance and Sexual Activity    Alcohol use: Yes     Alcohol/week: 0.0 standard drinks of alcohol     Comment: whisky/rare 1 time a month couple glasses.     Drug use: No    Sexual activity: Yes     Partners: Female   Other Topics Concern    Caffeine Concern Yes     Comment: 2 x week    Exercise Yes     Comment: daily stretching, treadmill 3 x week   Social History Narrative    vegitarian        Medications (Active prior to today's visit):  Current Outpatient Medications   Medication Sig Dispense Refill    Tadalafil 2.5 MG Oral Tab Take 1 tablet by mouth daily as needed. 90 tablet 5    cholecalciferol 1000 UNITS Oral Cap Take 1 capsule (1,000 Units total) by mouth daily. Allergies:  No Known Allergies    ROS:     A comprehensive 10 point review of systems was completed. Pertinent positives and negatives noted in the the HPI. PHYSICAL EXAM:     GENERAL APPEARANCE: well, developed, well nourished, in no acute distress  NEUROLOGIC: nonfocal, alert and oriented  HEAD: normocephalic, atraumatic  EYES: sclera non-icteric  EARS: hearing intact  ORAL CAVITY: mucosa moist  NECK/THYROID: no obvious goiter or masses  LUNGS: nonlabored breathing  ABDOMEN: soft, no obvious masses or tenderness  SKIN: no obvious rashes    : as noted above     ASSESSMENT/PLAN:   Diagnoses and all orders for this visit:    Encounter for fertility testing  -     US SCROTUM W/ DOPPLER (CPT=93975/28567); Future  -     Testosterone Total  -     CBC, Platelet; No Differential  -     Estradiol  -     Prolactin  -     TSH W Reflex To Free T4  -     PTH, Intact  -     Comp Metabolic Panel (14)    Family history of prostate cancer in father  -     PSA Total, Diagnostic    Erectile dysfunction, unspecified erectile dysfunction type  -     Tadalafil 2.5 MG Oral Tab; Take 1 tablet by mouth daily as needed. - as noted above. Thanks again for this consult.     Myriam German MD, B[facility] Dela [facility]  Urologist  [place] [address]  Office: [phone]

## 2023-11-08 ENCOUNTER — OFFICE VISIT (OUTPATIENT)
Dept: SURGERY | Facility: CLINIC | Age: 43
End: 2023-11-08
Payer: COMMERCIAL

## 2023-11-08 DIAGNOSIS — Z31.41 ENCOUNTER FOR FERTILITY TESTING: Primary | ICD-10-CM

## 2023-11-08 DIAGNOSIS — N52.9 ERECTILE DYSFUNCTION, UNSPECIFIED ERECTILE DYSFUNCTION TYPE: ICD-10-CM

## 2023-11-08 DIAGNOSIS — Z80.42 FAMILY HISTORY OF PROSTATE CANCER IN FATHER: ICD-10-CM

## 2023-11-08 PROCEDURE — 99244 OFF/OP CNSLTJ NEW/EST MOD 40: CPT | Performed by: UROLOGY

## 2023-11-08 RX ORDER — TADALAFIL 2.5 MG/1
1 TABLET ORAL DAILY PRN
Qty: 90 TABLET | Refills: 5 | Status: SHIPPED | OUTPATIENT
Start: 2023-11-08

## 2023-11-16 ENCOUNTER — TELEPHONE (OUTPATIENT)
Facility: LOCATION | Age: 43
End: 2023-11-16

## 2023-11-16 DIAGNOSIS — K40.20 BILATERAL INGUINAL HERNIA WITHOUT OBSTRUCTION OR GANGRENE, RECURRENCE NOT SPECIFIED: Primary | ICD-10-CM

## 2023-11-20 RX ORDER — MULTIVIT-MIN/IRON FUM/FOLIC AC 7.5 MG-4
1 TABLET ORAL DAILY
COMMUNITY

## 2023-12-01 ENCOUNTER — TELEPHONE (OUTPATIENT)
Facility: LOCATION | Age: 43
End: 2023-12-01

## 2023-12-01 NOTE — TELEPHONE ENCOUNTER
Transaction ID: [id number] ID: [id number] Date: 2023-12-01  Brooke Mckinney Patient  Member ID  [id number]    Date of Birth  1980-12-21    Gender  Male    Transaction Type  Outpatient Authorization    Organization  [address] logo     Certificate Information  Reference Number  [id number]    Status  NO ACTION REQUIRED    Message  Requested Service does not require preauthorization. We would strongly encourage you to check benefits for this service.     Member Information  Patient Name  Brooke Mckinney    Patient Date of Birth  [birth year]    Patient Gender  Male    Member ID  [id number]    Relationship to [address] Name  [place], [place]    Requesting Provider     Name  [address] [place] [address]  [id number]    Tax Id  [id number]    Specialty  [id number]  Provider Role  Provider    Address  [address] [place] [address], [address]    Phone  [phone]  Fax  [phone]    Contact Name  [address] [place] [address]    Service Information  Service Type  2 - Surgical    Place of Service  [address] [place] [address]    Service From - To Date  2023-12-18 - 2023-12-31    Level of Service  Elective    Diagnosis Code 1   - Unil inguinal hernia w/o obst or gangr not spcf as recur    Procedure Code 1 (CPT/HCPCS)  77809 - LAP ING HERNIA REPAIR INIT    Quantity  1 Units    Status  NO ACTION REQUIRED    Rendering Provider/Facility     Provider 1  Name  [address] [place] [address]  [id number]    Specialty  [id number]  Provider Role  Attending    Address  [address] [place] [address], [address]    Provider 2  Name  [facility]  [id number]    Provider Role  Facility    Address  [address] [facility], [address]

## 2023-12-18 ENCOUNTER — HOSPITAL ENCOUNTER (OUTPATIENT)
Facility: HOSPITAL | Age: 43
Setting detail: HOSPITAL OUTPATIENT SURGERY
Discharge: HOME | End: 2023-12-18
Attending: SURGERY | Admitting: SURGERY
Payer: COMMERCIAL

## 2023-12-18 ENCOUNTER — ANESTHESIA (OUTPATIENT)
Dept: SURGERY | Facility: HOSPITAL | Age: 43
End: 2023-12-18
Payer: COMMERCIAL

## 2023-12-18 ENCOUNTER — ANESTHESIA EVENT (OUTPATIENT)
Dept: SURGERY | Facility: HOSPITAL | Age: 43
End: 2023-12-18
Payer: COMMERCIAL

## 2023-12-18 VITALS
SYSTOLIC BLOOD PRESSURE: 126 MMHG | DIASTOLIC BLOOD PRESSURE: 90 MMHG | TEMPERATURE: 97 F | HEIGHT: 67 IN | OXYGEN SATURATION: 100 % | RESPIRATION RATE: 16 BRPM | BODY MASS INDEX: 20.88 KG/M2 | WEIGHT: 133 LBS | HEART RATE: 71 BPM

## 2023-12-18 PROCEDURE — 8E0W4CZ ROBOTIC ASSISTED PROCEDURE OF TRUNK REGION, PERCUTANEOUS ENDOSCOPIC APPROACH: ICD-10-PCS | Performed by: SURGERY

## 2023-12-18 PROCEDURE — 0YUA4JZ SUPPLEMENT BILATERAL INGUINAL REGION WITH SYNTHETIC SUBSTITUTE, PERCUTANEOUS ENDOSCOPIC APPROACH: ICD-10-PCS | Performed by: SURGERY

## 2023-12-18 PROCEDURE — 49650 LAP ING HERNIA REPAIR INIT: CPT

## 2023-12-18 PROCEDURE — 49650 LAP ING HERNIA REPAIR INIT: CPT | Performed by: SURGERY

## 2023-12-18 DEVICE — LAPAROSCOPIC SELF-FIXATING MESH POLYESTER WITH POLYLACTIC ACID GRIPS AND COLLAGEN FILM
Type: IMPLANTABLE DEVICE | Site: GROIN | Status: FUNCTIONAL
Brand: PROGRIP

## 2023-12-18 RX ORDER — HYDROMORPHONE HYDROCHLORIDE 1 MG/ML
0.2 INJECTION, SOLUTION INTRAMUSCULAR; INTRAVENOUS; SUBCUTANEOUS EVERY 5 MIN PRN
Status: DISCONTINUED | OUTPATIENT
Start: 2023-12-18 | End: 2023-12-18

## 2023-12-18 RX ORDER — PROCHLORPERAZINE EDISYLATE 5 MG/ML
5 INJECTION INTRAMUSCULAR; INTRAVENOUS EVERY 8 HOURS PRN
Status: DISCONTINUED | OUTPATIENT
Start: 2023-12-18 | End: 2023-12-18

## 2023-12-18 RX ORDER — CEFAZOLIN SODIUM/WATER 2 G/20 ML
2 SYRINGE (ML) INTRAVENOUS ONCE
Status: COMPLETED | OUTPATIENT
Start: 2023-12-18 | End: 2023-12-18

## 2023-12-18 RX ORDER — HYDROMORPHONE HYDROCHLORIDE 1 MG/ML
0.4 INJECTION, SOLUTION INTRAMUSCULAR; INTRAVENOUS; SUBCUTANEOUS EVERY 5 MIN PRN
Status: DISCONTINUED | OUTPATIENT
Start: 2023-12-18 | End: 2023-12-18

## 2023-12-18 RX ORDER — HEPARIN SODIUM 5000 [USP'U]/ML
5000 INJECTION, SOLUTION INTRAVENOUS; SUBCUTANEOUS ONCE
Status: COMPLETED | OUTPATIENT
Start: 2023-12-18 | End: 2023-12-18

## 2023-12-18 RX ORDER — HYDROMORPHONE HYDROCHLORIDE 1 MG/ML
INJECTION, SOLUTION INTRAMUSCULAR; INTRAVENOUS; SUBCUTANEOUS
Status: COMPLETED
Start: 2023-12-18 | End: 2023-12-18

## 2023-12-18 RX ORDER — LIDOCAINE HYDROCHLORIDE ANHYDROUS AND DEXTROSE MONOHYDRATE .8; 5 G/100ML; G/100ML
INJECTION, SOLUTION INTRAVENOUS CONTINUOUS PRN
Status: DISCONTINUED | OUTPATIENT
Start: 2023-12-18 | End: 2023-12-18 | Stop reason: SURG

## 2023-12-18 RX ORDER — SENNA AND DOCUSATE SODIUM 50; 8.6 MG/1; MG/1
1 TABLET, FILM COATED ORAL DAILY
Qty: 30 TABLET | Refills: 0 | Status: SHIPPED | OUTPATIENT
Start: 2023-12-18

## 2023-12-18 RX ORDER — SODIUM CHLORIDE, SODIUM LACTATE, POTASSIUM CHLORIDE, CALCIUM CHLORIDE 600; 310; 30; 20 MG/100ML; MG/100ML; MG/100ML; MG/100ML
INJECTION, SOLUTION INTRAVENOUS CONTINUOUS
Status: DISCONTINUED | OUTPATIENT
Start: 2023-12-18 | End: 2023-12-18

## 2023-12-18 RX ORDER — DIPHENHYDRAMINE HYDROCHLORIDE 50 MG/ML
12.5 INJECTION INTRAMUSCULAR; INTRAVENOUS AS NEEDED
Status: DISCONTINUED | OUTPATIENT
Start: 2023-12-18 | End: 2023-12-18

## 2023-12-18 RX ORDER — MIDAZOLAM HYDROCHLORIDE 1 MG/ML
1 INJECTION INTRAMUSCULAR; INTRAVENOUS EVERY 5 MIN PRN
Status: DISCONTINUED | OUTPATIENT
Start: 2023-12-18 | End: 2023-12-18

## 2023-12-18 RX ORDER — BUPIVACAINE HYDROCHLORIDE 2.5 MG/ML
INJECTION, SOLUTION EPIDURAL; INFILTRATION; INTRACAUDAL AS NEEDED
Status: DISCONTINUED | OUTPATIENT
Start: 2023-12-18 | End: 2023-12-18 | Stop reason: HOSPADM

## 2023-12-18 RX ORDER — HYDROCODONE BITARTRATE AND ACETAMINOPHEN 5; 325 MG/1; MG/1
2 TABLET ORAL ONCE AS NEEDED
Status: COMPLETED | OUTPATIENT
Start: 2023-12-18 | End: 2023-12-18

## 2023-12-18 RX ORDER — GLYCOPYRROLATE 0.2 MG/ML
INJECTION, SOLUTION INTRAMUSCULAR; INTRAVENOUS AS NEEDED
Status: DISCONTINUED | OUTPATIENT
Start: 2023-12-18 | End: 2023-12-18 | Stop reason: SURG

## 2023-12-18 RX ORDER — LIDOCAINE HYDROCHLORIDE 10 MG/ML
INJECTION, SOLUTION EPIDURAL; INFILTRATION; INTRACAUDAL; PERINEURAL AS NEEDED
Status: DISCONTINUED | OUTPATIENT
Start: 2023-12-18 | End: 2023-12-18 | Stop reason: SURG

## 2023-12-18 RX ORDER — MEPERIDINE HYDROCHLORIDE 25 MG/ML
12.5 INJECTION INTRAMUSCULAR; INTRAVENOUS; SUBCUTANEOUS AS NEEDED
Status: DISCONTINUED | OUTPATIENT
Start: 2023-12-18 | End: 2023-12-18

## 2023-12-18 RX ORDER — ONDANSETRON 2 MG/ML
4 INJECTION INTRAMUSCULAR; INTRAVENOUS EVERY 6 HOURS PRN
Status: DISCONTINUED | OUTPATIENT
Start: 2023-12-18 | End: 2023-12-18

## 2023-12-18 RX ORDER — KETOROLAC TROMETHAMINE 30 MG/ML
INJECTION, SOLUTION INTRAMUSCULAR; INTRAVENOUS AS NEEDED
Status: DISCONTINUED | OUTPATIENT
Start: 2023-12-18 | End: 2023-12-18 | Stop reason: SURG

## 2023-12-18 RX ORDER — SCOLOPAMINE TRANSDERMAL SYSTEM 1 MG/1
1 PATCH, EXTENDED RELEASE TRANSDERMAL ONCE
Status: DISCONTINUED | OUTPATIENT
Start: 2023-12-18 | End: 2023-12-18 | Stop reason: HOSPADM

## 2023-12-18 RX ORDER — OXYCODONE HYDROCHLORIDE 5 MG/1
5 TABLET ORAL EVERY 6 HOURS PRN
Qty: 20 TABLET | Refills: 0 | Status: SHIPPED | OUTPATIENT
Start: 2023-12-18

## 2023-12-18 RX ORDER — DEXAMETHASONE SODIUM PHOSPHATE 4 MG/ML
VIAL (ML) INJECTION AS NEEDED
Status: DISCONTINUED | OUTPATIENT
Start: 2023-12-18 | End: 2023-12-18 | Stop reason: SURG

## 2023-12-18 RX ORDER — ACETAMINOPHEN 500 MG
1000 TABLET ORAL ONCE AS NEEDED
Status: COMPLETED | OUTPATIENT
Start: 2023-12-18 | End: 2023-12-18

## 2023-12-18 RX ORDER — NEOSTIGMINE METHYLSULFATE 1 MG/ML
INJECTION, SOLUTION INTRAVENOUS AS NEEDED
Status: DISCONTINUED | OUTPATIENT
Start: 2023-12-18 | End: 2023-12-18 | Stop reason: SURG

## 2023-12-18 RX ORDER — NALOXONE HYDROCHLORIDE 0.4 MG/ML
0.08 INJECTION, SOLUTION INTRAMUSCULAR; INTRAVENOUS; SUBCUTANEOUS AS NEEDED
Status: DISCONTINUED | OUTPATIENT
Start: 2023-12-18 | End: 2023-12-18

## 2023-12-18 RX ORDER — HYDROCODONE BITARTRATE AND ACETAMINOPHEN 5; 325 MG/1; MG/1
1 TABLET ORAL ONCE AS NEEDED
Status: COMPLETED | OUTPATIENT
Start: 2023-12-18 | End: 2023-12-18

## 2023-12-18 RX ORDER — ACETAMINOPHEN 500 MG
1000 TABLET ORAL ONCE
Status: DISCONTINUED | OUTPATIENT
Start: 2023-12-18 | End: 2023-12-18 | Stop reason: HOSPADM

## 2023-12-18 RX ORDER — HYDROMORPHONE HYDROCHLORIDE 1 MG/ML
0.6 INJECTION, SOLUTION INTRAMUSCULAR; INTRAVENOUS; SUBCUTANEOUS EVERY 5 MIN PRN
Status: DISCONTINUED | OUTPATIENT
Start: 2023-12-18 | End: 2023-12-18

## 2023-12-18 RX ORDER — ONDANSETRON 2 MG/ML
INJECTION INTRAMUSCULAR; INTRAVENOUS AS NEEDED
Status: DISCONTINUED | OUTPATIENT
Start: 2023-12-18 | End: 2023-12-18 | Stop reason: SURG

## 2023-12-18 RX ADMIN — SODIUM CHLORIDE, SODIUM LACTATE, POTASSIUM CHLORIDE, CALCIUM CHLORIDE: 600; 310; 30; 20 INJECTION, SOLUTION INTRAVENOUS at 10:56:00

## 2023-12-18 RX ADMIN — SODIUM CHLORIDE, SODIUM LACTATE, POTASSIUM CHLORIDE, CALCIUM CHLORIDE: 600; 310; 30; 20 INJECTION, SOLUTION INTRAVENOUS at 12:41:00

## 2023-12-18 RX ADMIN — LIDOCAINE HYDROCHLORIDE 50 MG: 10 INJECTION, SOLUTION EPIDURAL; INFILTRATION; INTRACAUDAL; PERINEURAL at 10:58:00

## 2023-12-18 RX ADMIN — ONDANSETRON 4 MG: 2 INJECTION INTRAMUSCULAR; INTRAVENOUS at 12:20:00

## 2023-12-18 RX ADMIN — NEOSTIGMINE METHYLSULFATE 3.5 MG: 1 INJECTION, SOLUTION INTRAVENOUS at 12:20:00

## 2023-12-18 RX ADMIN — LIDOCAINE HYDROCHLORIDE ANHYDROUS AND DEXTROSE MONOHYDRATE 2 MG/KG/HR: .8; 5 INJECTION, SOLUTION INTRAVENOUS at 11:05:00

## 2023-12-18 RX ADMIN — SODIUM CHLORIDE, SODIUM LACTATE, POTASSIUM CHLORIDE, CALCIUM CHLORIDE: 600; 310; 30; 20 INJECTION, SOLUTION INTRAVENOUS at 12:21:00

## 2023-12-18 RX ADMIN — DEXAMETHASONE SODIUM PHOSPHATE 8 MG: 4 MG/ML VIAL (ML) INJECTION at 11:10:00

## 2023-12-18 RX ADMIN — CEFAZOLIN SODIUM/WATER 2 G: 2 G/20 ML SYRINGE (ML) INTRAVENOUS at 11:05:00

## 2023-12-18 RX ADMIN — GLYCOPYRROLATE 0.4 MG: 0.2 INJECTION, SOLUTION INTRAMUSCULAR; INTRAVENOUS at 12:20:00

## 2023-12-18 RX ADMIN — KETOROLAC TROMETHAMINE 30 MG: 30 INJECTION, SOLUTION INTRAMUSCULAR; INTRAVENOUS at 12:20:00

## 2023-12-18 NOTE — BRIEF OP NOTE
Pre-Operative Diagnosis: Bilateral inguinal hernia without obstruction or gangrene, recurrence not specified [K40.20]     Post-Operative Diagnosis: Bilateral inguinal hernia without obstruction or gangrene, recurrence not specified [K40.20]      Procedure Performed:   BILATERAL XI ROBOT-ASSISTED LAPAROSCOPIC INGUINAL HERNIA REPAIR WITH MESH    Surgeon(s) and Role:     * Joyce Goode MD - Primary    Assistant(s):  PA: Siri Beaver PA-C     Surgical Findings: bilateral incarcerated direct inguinal hernias     Specimen: none     Estimated Blood Loss: Blood Output: 5 mL (12/18/2023 11:58 AM)      Dictation Number:      Billy Peterson MD  12/18/2023  12:23 PM

## 2023-12-18 NOTE — OPERATIVE REPORT
OPERATIVE REPORT    PREOPERATIVE DIAGNOSIS:  Bilateral inguinal hernia. POSTOPERATIVE DIAGNOSIS:  Bilateral  incarcerated inguinal hernia. PROCEDURE PERFORMED: Robotic bilateral incarcerated inguinal hernia repair with mesh ( ProGrip mesh used)      ASSISTANT: Kathy Arias    ANESTHESIA: General endotracheal anesthesia. Anesthesiologist.: Ewa Escobedo DO CRNA.: Rianna Honeycutt CRNA    BLOOD LOSS: Less than 5 mL. COMPLICATIONS: None apparent. IMPLANTS: Mesh as above. FINDINGS: Bilateral direct incarcerated inguinal hernia. COMPLICATIONS: None apparent. DISPOSITION: The patient was awakened from anesthesia and brought to the recovery room in stable condition. He tolerated the procedure without apparent complication. Needle, sponge, instrument counts were correct at the end of procedure. Please note preprocedure antibiotic and DVT prophylaxis administered per protocol and a time-out were performed prior to initiating the procedure identifying the correct patient, procedure, surgeon, and sites of surgery. I indicated the sites of surgery in the preoperative holding area and the patient concurred. INDICATIONS: Please see the preprocedure history and physical. Briefly, the patient is a  38year old male with a painful bulge of the inguinal region. Physical examination is consistent with inguinal hernia. The risks, benefits, and alternatives of robotic inguinal hernia repair were explained to the patient. The risks explained included but were not limited to bleeding, infection, recurrence, injury to adjacent organs and structures, injury to the vas deferens, injury to the blood supply of the testicle with potential for testicular ischemia or atrophy. The need for therapeutic, diagnostic for surgical intervention was also discussed with the patient. The patient voiced understanding.  All pertinent questions were answered to the patient's satisfaction after which the patient provided willing and informed consent to proceed with surgery. PROCEDURE: The patient was brought to the operating room, and after induction of general endotracheal anesthesia, the anesthesiologist performed an TAP block. Next, the abdomen was prepped and draped in usual sterile fashion. The patient was placed in Trendelenburg position. The umbilicus was grasped, elevated with an Allis clamp and 2 perforating towel clips, and an 8-mm incision was created cephalad to the umbilicus. Through this a Veress needle was introduced into the abdomen and pneumoperitoneum was established in usual manner. An 8-mm DaVinci Visi-Port trocar was then placed under direct vision followed by a laparoscope. Diagnostic survey of the abdomen revealed no other acute pathology or iatrogenic injury. Attention was turned to the inguinal region where the hernia defect was identified. At this point, two 8 mm trocars were placed on either side of the abdomen in the midclavicular line under direct vision. Next, the robot was then docked and instruments placed under direct vision. Repair of the hernias were completed in similar manner bilaterally. The peritoneum was grasped, retracted, and divided . Once the peritoneum was incised, blunt and sharp dissection was used with judicious use of electrocautery to achieve hemostasis. Medial to lateral dissection was accomplished identifying initially the pubic tubercle and Bony's ligament and the soft tissues were dissected laterally to create a sufficient pocket to accommodate mesh. Next, the hernia sac was grasped and retracted. Blunt and sharp dissection technique was utilized to reduce the hernia sac into the abdominal cavity. Once this was accomplished, a ProGrip  mesh was placed in the inguinal region. Once the mesh was in place, the peritoneal flap was then reapproximated in anatomic position and secured using running 2-0 V-Loc suture.  At the completion of the operation, all instruments were removed. Needle, sponge, instrument counts were correct. Pneumoperitoneum was released and all instruments had been removed under direct vision as well. The skin incisions are closed with 4-0 Monocryl suture. Dermabond was used to seal the incisions. The anesthesiologist performed a TAP block. The patient was awakened from anesthesia, brought to the recovery room in stable condition having tolerated procedure without apparent complication. Needle, sponge, instrument counts correct at the end of procedure and operative findings were discussed with the patient's family. Marek Pate. Clara Prakash MD FACS  Trauma Medical Director, [facility] General Surgery    The [facility] makes medical notes like these available to patients in the interest of transparency. Please be advised this is a medical document. Medical documents are intended to carry relevant information, facts as evident, and the clinical opinion of the practitioner. The medical note is intended as peer to peer communication and may appear blunt or direct. It is written in medical language and may contain abbreviations or verbiage that are unfamiliar. This note was prepared using [employer] voice recognition dictation software. As a result, errors may occur. When identified, these errors have been corrected.  While every attempt is made to correct errors during dictation, discrepancies may still exist.

## 2023-12-18 NOTE — ANESTHESIA PROCEDURE NOTES
Airway  Date/Time: 12/18/2023 11:01 AM  Urgency: elective      General Information and Staff    Patient location during procedure: OR  Anesthesiologist: Maria Alejandra Law DO  Resident/CRNA: Katty Ross CRNA  Performed: CRNA   Performed by: Katty Ross CRNA  Authorized by: Maria Alejandra Law DO      Indications and Patient Condition  Indications for airway management: anesthesia  Sedation level: deep  Preoxygenated: yes  Patient position: sniffing  Mask difficulty assessment: 1 - vent by mask    Final Airway Details  Final airway type: endotracheal airway      Successful airway: ETT  Cuffed: yes   Successful intubation technique: direct laryngoscopy  Endotracheal tube insertion site: oral  Blade: Tangela  Blade size: #3  ETT size (mm): 7.5    Cormack-Lehane Classification: grade I - full view of glottis  Placement verified by: capnometry   Measured from: lips  ETT to lips (cm): 21  Number of attempts at approach: 1

## 2023-12-20 ENCOUNTER — PATIENT MESSAGE (OUTPATIENT)
Facility: LOCATION | Age: 43
End: 2023-12-20

## 2023-12-21 NOTE — TELEPHONE ENCOUNTER
Called patient. Made follow up appointment with Dr. Travis Alaniz. C/o pain at incision site that has been improving daily. Advised patient to call back if pain worsens. Patient voiced understanding.      Future Appointments   Date Time Provider Saurabh Wade   1/3/2024  3:30 PM Jeanette Allison MD [facility]

## 2023-12-21 NOTE — TELEPHONE ENCOUNTER
From: Misha Ceron  To: Caity Devlin  Sent: 12/20/2023 6:21 PM CST  Subject: Followup appointment hernia repair surgery    Hello,    Could you please schedule a followup appointment post hernia repair in 1st week of Jan?    Chandan Connor

## 2024-01-03 ENCOUNTER — OFFICE VISIT (OUTPATIENT)
Facility: LOCATION | Age: 44
End: 2024-01-03
Payer: COMMERCIAL

## 2024-01-03 VITALS — HEART RATE: 80 BPM | TEMPERATURE: 98 F

## 2024-01-03 DIAGNOSIS — Z97.8 STATUS POST INSERTION OF FOLEY CATHETER: ICD-10-CM

## 2024-01-03 DIAGNOSIS — R10.2 SUPRAPUBIC ABDOMINAL PAIN: Primary | ICD-10-CM

## 2024-01-03 PROBLEM — K40.00 BILATERAL INCARCERATED INGUINAL HERNIA: Status: RESOLVED | Noted: 2023-07-14 | Resolved: 2024-01-03

## 2024-01-03 PROCEDURE — 99024 POSTOP FOLLOW-UP VISIT: CPT | Performed by: SURGERY

## 2024-01-03 NOTE — PROGRESS NOTES
Post Operative Visit Note       Active Problems  No diagnosis found.     Chief Complaint   Chief Complaint   Patient presents with    Post-Op     PO - BILATERAL XI ROBOT-ASSISTED LAPAROSCOPIC INGUINAL HERNIA REPAIR WITH MESH 12/18, mild pain, after urinating the patient is having discomfort, fatigue, painful to stand up straight after sitting for a while, abd pain, more pain on the right side of abd area, no other symptoms.          History of Present Illness   43 year old male POD# 16 from robotic bilateral inguinal hernia repair presents for postop follow up.    Patient reports ongoing pain and discomfort to his right abdomen since surgery.  He reports sharp pain with prolonged sitting and engaging of his core.  The patient states the pain resolves quickly.  He states this has overall been improving since surgery.  The patient also reports abdominal pain and discomfort with urination.  He states initially he had associated dysuria and difficulty urinating.  He states the symptoms have improved however his abdominal pain persists.  The patient denies foul-smelling urine, frequency, or ongoing dysuria.    He is tolerating oral intake and having regular bowel movements.  He does report constipation in the initial postoperative period.    Overall the patient is doing well but does endorse concern for his healing.       Allergies  Mariano has No Known Allergies.    Past Medical / Surgical / Social / Family History    The past medical and past surgical history have been reviewed by me today.     Past Medical History:   Diagnosis Date    Back problem     Degenerative arthritis of cervical spine     Problems with swallowing     at times    Visual impairment      Past Surgical History:   Procedure Laterality Date    LAPAROSCOPIC INGUINAL HERNIA REPAIR Bilateral 12/18/2023       The family history and social history have been reviewed by me today.    Family History   Problem Relation Age of Onset    Diabetes Maternal  Grandfather     Heart Disease Father      Social History     Socioeconomic History    Marital status:    Occupational History    Occupation: IT   Tobacco Use    Smoking status: Never    Smokeless tobacco: Never   Vaping Use    Vaping Use: Never used   Substance and Sexual Activity    Alcohol use: Not Currently     Comment: whisky/rare 1 time a month couple glasses.    Drug use: No    Sexual activity: Yes     Partners: Female   Other Topics Concern    Caffeine Concern Yes     Comment: 2 x week    Exercise Yes     Comment: daily stretching, treadmill 3 x week        Current Outpatient Medications:     oxyCODONE 5 MG Oral Tab, Take 1 tablet (5 mg total) by mouth every 6 (six) hours as needed for Pain., Disp: 20 tablet, Rfl: 0    senna-docusate 8.6-50 MG Oral Tab, Take 1 tablet by mouth daily., Disp: 30 tablet, Rfl: 0    Multiple Vitamins-Minerals (MULTI-VITAMIN/MINERALS) Oral Tab, Take 1 tablet by mouth daily., Disp: , Rfl:     Omega-3 Fatty Acids (FISH OIL OR), Take by mouth., Disp: , Rfl:     NON FORMULARY, XAVI, Disp: , Rfl:       Review of Systems  A 10 point Review of Systems has been completed by me today and is negative except as above in the HPI.    Physical Findings   Pulse 80   Temp 98 °F (36.7 °C) (Temporal)   Gen/psych: alert and oriented, cooperative, no apparent distress  Cardiovascular: regular rate  Respiratory: respirations unlabored, no wheeze  Abdominal: soft, non-tender, non-distended, no guarding/rebound  Incisions:         Assessment/Plan  No diagnosis found.    43 year old male POD# *** from ***    1.        No orders of the defined types were placed in this encounter.       Imaging & Referrals   None    Follow Up  Return if symptoms worsen or fail to improve.    Fab Allison MD  [facility] General Surgery  1/3/2024  4:07 PM

## 2024-01-03 NOTE — PROGRESS NOTES
Post Operative Visit Note       Active Problems  1. Suprapubic abdominal pain    2. Status post insertion of Patel catheter         Chief Complaint   Chief Complaint   Patient presents with    Post-Op     PO - BILATERAL XI ROBOT-ASSISTED LAPAROSCOPIC INGUINAL HERNIA REPAIR WITH MESH 12/18, mild pain, after urinating the patient is having discomfort, fatigue, painful to stand up straight after sitting for a while, abd pain, more pain on the right side of abd area, no other symptoms.          History of Present Illness     43 year old male POD# 16 from robotic bilateral inguinal hernia repair presents for postop follow up.    Patient reports ongoing pain and discomfort to his right abdomen since surgery.  He reports sharp pain with prolonged sitting and engaging of his core.  The patient states the pain resolves quickly.  He states this has overall been improving since surgery.  The patient also reports abdominal pain and discomfort with urination.  He states initially he had associated dysuria and difficulty urinating.  He states the symptoms have improved however his abdominal pain persists.  The patient denies foul-smelling urine, frequency, or ongoing dysuria.    He is tolerating oral intake and having regular bowel movements.  He does report constipation in the initial postoperative period.    Overall the patient is doing well but does endorse concern for his healing.     Allergies  Mariano has No Known Allergies.    Past Medical / Surgical / Social / Family History    The past medical and past surgical history have been reviewed by me today.     Past Medical History:   Diagnosis Date    Back problem     Degenerative arthritis of cervical spine     Problems with swallowing     at times    Visual impairment      Past Surgical History:   Procedure Laterality Date    LAPAROSCOPIC INGUINAL HERNIA REPAIR Bilateral 12/18/2023       The family history and social history have been reviewed by me today.    Family History    Problem Relation Age of Onset    Diabetes Maternal Grandfather     Heart Disease Father      Social History     Socioeconomic History    Marital status:    Occupational History    Occupation: IT   Tobacco Use    Smoking status: Never    Smokeless tobacco: Never   Vaping Use    Vaping Use: Never used   Substance and Sexual Activity    Alcohol use: Not Currently     Comment: whisky/rare 1 time a month couple glasses.    Drug use: No    Sexual activity: Yes     Partners: Female   Other Topics Concern    Caffeine Concern Yes     Comment: 2 x week    Exercise Yes     Comment: daily stretching, treadmill 3 x week        Current Outpatient Medications:     oxyCODONE 5 MG Oral Tab, Take 1 tablet (5 mg total) by mouth every 6 (six) hours as needed for Pain., Disp: 20 tablet, Rfl: 0    senna-docusate 8.6-50 MG Oral Tab, Take 1 tablet by mouth daily., Disp: 30 tablet, Rfl: 0    Multiple Vitamins-Minerals (MULTI-VITAMIN/MINERALS) Oral Tab, Take 1 tablet by mouth daily., Disp: , Rfl:     Omega-3 Fatty Acids (FISH OIL OR), Take by mouth., Disp: , Rfl:     NON FORMULARY, XAVI, Disp: , Rfl:       Review of Systems  The Review of Systems has been reviewed by me during today.  Review of Systems   Constitutional: Negative.  Negative for chills, fatigue and fever.   HENT: Negative.     Eyes: Negative.    Respiratory:  Negative for cough and shortness of breath.    Cardiovascular: Negative.    Gastrointestinal:  Positive for abdominal pain. Negative for constipation, diarrhea, nausea and vomiting.   Genitourinary: Negative.  Negative for dysuria and frequency.   Musculoskeletal: Negative.  Negative for arthralgias and gait problem.   Skin: Negative.    Neurological: Negative.    Psychiatric/Behavioral: Negative.         Physical Findings   Pulse 80   Temp 98 °F (36.7 °C) (Temporal)   Physical Exam  Vitals and nursing note reviewed.   Constitutional:       General: He is not in acute distress.     Appearance: He is  well-developed.   HENT:      Head: Normocephalic and atraumatic.   Eyes:      General: No scleral icterus.     Pupils: Pupils are equal, round, and reactive to light.   Neck:      Vascular: No JVD.      Trachea: No tracheal deviation.   Cardiovascular:      Rate and Rhythm: Normal rate and regular rhythm.   Pulmonary:      Effort: Pulmonary effort is normal. No respiratory distress.      Breath sounds: No stridor.   Abdominal:      General: Bowel sounds are normal. There is no distension.      Palpations: Abdomen is soft. Abdomen is not rigid. There is no mass.      Tenderness: There is no abdominal tenderness. There is no guarding or rebound. Negative signs include Cunningham's sign and McBurney's sign.      Hernia: No hernia is present. There is no hernia in the left inguinal area or right inguinal area.       Genitourinary:     Penis: Normal.       Testes: Normal.      Epididymis:      Right: Normal.      Left: Normal.       Musculoskeletal:         General: Normal range of motion.      Cervical back: Normal range of motion and neck supple.   Skin:     General: Skin is warm and dry.   Neurological:      Mental Status: He is alert and oriented to person, place, and time.   Psychiatric:         Behavior: Behavior normal.             Assessment   1. Suprapubic abdominal pain    2. Status post insertion of Patel catheter          Plan     The patient is recovering nicely following robotic assisted bilateral inguinal hernia repair with mesh.    The anticipated postoperative recovery was discussed with the patient in detail.    Dietary, activity, and exercise recommendations along with restrictions were discussed with the patient during today's visit.    Wound care instructions were discussed during today's visit.    The patient will return to my attention on an as needed basis.    The patient is encouraged to continue seeing the primary care physician for ongoing medical needs.    The patient was given ample opportunity  to ask questions.  The patient's questions were answered in detail and to the patient's satisfaction.  The patient voiced understanding of the postoperative care plan.           Orders Placed This Encounter   Procedures    UA/M WITH CULTURE REFLEX [3020] [Q]       Imaging & Referrals   None    Follow Up  Return if symptoms worsen or fail to improve.    Fab Allison MD

## 2024-01-04 PROBLEM — R10.2 SUPRAPUBIC ABDOMINAL PAIN: Status: ACTIVE | Noted: 2024-01-04

## 2024-01-04 PROBLEM — Z97.8 STATUS POST INSERTION OF FOLEY CATHETER: Status: ACTIVE | Noted: 2024-01-04

## 2024-01-22 ENCOUNTER — OFFICE VISIT (OUTPATIENT)
Facility: LOCATION | Age: 44
End: 2024-01-22
Payer: COMMERCIAL

## 2024-01-22 VITALS — HEART RATE: 83 BPM | TEMPERATURE: 99 F

## 2024-01-22 DIAGNOSIS — Z98.890 POST-OPERATIVE STATE: Primary | ICD-10-CM

## 2024-01-22 PROCEDURE — 99024 POSTOP FOLLOW-UP VISIT: CPT

## 2024-01-22 NOTE — PROGRESS NOTES
Post Operative Visit Note       Active Problems  1. Post-operative state         Chief Complaint   Chief Complaint   Patient presents with    Post-Op     PO - follow up for PBP Robotic bilateral incarcerated inguinal hernia repair with mesh ( ProGrip mesh used) 12/18, Pt. States to have pain. Pt. States getting up from bed feels pain of left side. Pt. States pain in groin after peeing feels uncomfortable. Pt. Denies n/v/d Pt. States cannot put any pressure with BM due to pain in groin. Pt. Denies chills/fever                History of Present Illness   The patient presents for continued care and evaluation following a robotic bilateral inguinal hernia repair with Dr. Allison on December 18, 2023.    The patient saw Dr. Allison once postoperatively on January 3, 2024.  He was doing well at that time.  He states he was instructed to make 1 additional follow-up appointment.    The patient continues to complain of some abdominal and groin pain.  The pain is typically with activity and only lasts several seconds to minutes.  The patient states he often has pain when reaching for objects or when changing positions from laying to sitting in bed.  He states the pain is typically in the right groin and near the right sided abdominal incision.  He describes the pain as blunt primarily, but can be sharp for a few seconds.    Additionally, the patient reports some right groin pain when straining for bowel movements.  He also states he has some sharp pain at the base of the penis after urinating.    In general, the patient states his pain has improved since the time of his operation.  He is not requiring pain medications.    The patient is tolerating a diet.  He denies nausea or vomiting.  He denies diarrhea or constipation.        Allergies  Mariano has No Known Allergies.    Past Medical / Surgical / Social / Family History    The past medical and past surgical history have been reviewed by me today.     Past Medical History:    Diagnosis Date    Back problem     Degenerative arthritis of cervical spine     Problems with swallowing     at times    Visual impairment      Past Surgical History:   Procedure Laterality Date    LAPAROSCOPIC INGUINAL HERNIA REPAIR Bilateral 12/18/2023       The family history and social history have been reviewed by me today.    Family History   Problem Relation Age of Onset    Diabetes Maternal Grandfather     Heart Disease Father      Social History     Socioeconomic History    Marital status:    Occupational History    Occupation: IT   Tobacco Use    Smoking status: Never    Smokeless tobacco: Never   Vaping Use    Vaping Use: Never used   Substance and Sexual Activity    Alcohol use: Not Currently     Comment: whisky/rare 1 time a month couple glasses.    Drug use: No    Sexual activity: Yes     Partners: Female   Other Topics Concern    Caffeine Concern Yes     Comment: 2 x week    Exercise Yes     Comment: daily stretching, treadmill 3 x week        Current Outpatient Medications:     oxyCODONE 5 MG Oral Tab, Take 1 tablet (5 mg total) by mouth every 6 (six) hours as needed for Pain., Disp: 20 tablet, Rfl: 0    senna-docusate 8.6-50 MG Oral Tab, Take 1 tablet by mouth daily., Disp: 30 tablet, Rfl: 0    Multiple Vitamins-Minerals (MULTI-VITAMIN/MINERALS) Oral Tab, Take 1 tablet by mouth daily., Disp: , Rfl:     Omega-3 Fatty Acids (FISH OIL OR), Take by mouth., Disp: , Rfl:     NON FORMULARY, XAVI, Disp: , Rfl:       Review of Systems  The Review of Systems has been reviewed by me during today.  Review of Systems    Physical Findings   Pulse 83   Temp 98.7 °F (37.1 °C) (Temporal)   Physical Exam  Nursing note reviewed.   Constitutional:       Appearance: Normal appearance. He is normal weight.   HENT:      Head: Normocephalic and atraumatic.      Right Ear: External ear normal.      Left Ear: External ear normal.      Nose: Nose normal.   Eyes:      Conjunctiva/sclera: Conjunctivae normal.    Abdominal:      General: Abdomen is flat. There is no distension.      Palpations: Abdomen is soft. There is no mass.      Tenderness: There is no abdominal tenderness.      Hernia: No hernia is present.      Comments: Clinical exam of the patient's abdomen reveals it to be soft, nondistended, nontender to palpation.  Laparoscopic incision sites are clean, dry, intact without surrounding erythema or cellulitis.  No evidence of recurrence of inguinal hernias.   Neurological:      Mental Status: He is alert.   Psychiatric:         Mood and Affect: Mood normal.         Behavior: Behavior normal.             Assessment   1. Post-operative state          Plan     I had a prolonged conversation with the patient and his wife regarding his ongoing abdominal pain since the time of his robotic bilateral inguinal hernia repair with Dr. Allison on December 18, 2023.    I explained to the patient that his pain should continue to improve.  He should monitor for any persistent or worsening pain at the sites.  I explained that certain awkward movements such as reaching or twisting for something may cause short bouts of pain, but this should resolve with time.    Activity restrictions were reviewed.  He will have a lifting restriction of 15 to 20 pounds for total of 6 weeks postoperatively.    I instructed him to take ibuprofen and Tylenol as needed for pain management.    He may also massage the area or apply a heating pad to the area for comfort measures.    I recommend he take stool softeners to ensure he avoids constipation and does not have to strain with bowel movements.  He should have a goal of 1-2 soft bowel movements daily.    The patient may shower.  He should avoid scrubbing his incisions, but may allow soap and water to run over the incisions.    I instructed the patient to call our office if these pain symptoms persist after 6 to 8 weeks postoperatively.    The patient and his wife expressed understanding with the  above plan.  All questions and concerns were addressed.  I have no further follow-up scheduled for this patient at this time.  He may see Dr. Allison or a PA on an as-needed basis in the future.         No orders of the defined types were placed in this encounter.      Imaging & Referrals   None    Follow Up  No follow-ups on file.    Lisset Bazzi PA-C

## 2024-04-09 ENCOUNTER — OFFICE VISIT (OUTPATIENT)
Dept: FAMILY MEDICINE CLINIC | Facility: CLINIC | Age: 44
End: 2024-04-09
Payer: COMMERCIAL

## 2024-04-09 VITALS
SYSTOLIC BLOOD PRESSURE: 102 MMHG | TEMPERATURE: 98 F | HEIGHT: 67 IN | WEIGHT: 135 LBS | HEART RATE: 63 BPM | OXYGEN SATURATION: 98 % | BODY MASS INDEX: 21.19 KG/M2 | RESPIRATION RATE: 18 BRPM | DIASTOLIC BLOOD PRESSURE: 60 MMHG

## 2024-04-09 DIAGNOSIS — E55.9 VITAMIN D DEFICIENCY: ICD-10-CM

## 2024-04-09 DIAGNOSIS — Z12.5 SCREENING FOR PROSTATE CANCER: ICD-10-CM

## 2024-04-09 DIAGNOSIS — E53.8 VITAMIN B12 DEFICIENCY: ICD-10-CM

## 2024-04-09 DIAGNOSIS — F32.A MILD DEPRESSION: ICD-10-CM

## 2024-04-09 DIAGNOSIS — Z00.00 ROUTINE GENERAL MEDICAL EXAMINATION AT A HEALTH CARE FACILITY: Primary | ICD-10-CM

## 2024-04-09 DIAGNOSIS — Z80.42 FAMILY HISTORY OF PROSTATE CANCER: ICD-10-CM

## 2024-04-09 DIAGNOSIS — F41.1 GAD (GENERALIZED ANXIETY DISORDER): ICD-10-CM

## 2024-04-09 DIAGNOSIS — R20.9 COLD EXTREMITIES: ICD-10-CM

## 2024-04-09 DIAGNOSIS — R63.6 LOW WEIGHT: ICD-10-CM

## 2024-04-09 PROCEDURE — 99396 PREV VISIT EST AGE 40-64: CPT | Performed by: FAMILY MEDICINE

## 2024-04-09 PROCEDURE — 99213 OFFICE O/P EST LOW 20 MIN: CPT | Performed by: FAMILY MEDICINE

## 2024-04-09 PROCEDURE — 3078F DIAST BP <80 MM HG: CPT | Performed by: FAMILY MEDICINE

## 2024-04-09 PROCEDURE — 3008F BODY MASS INDEX DOCD: CPT | Performed by: FAMILY MEDICINE

## 2024-04-09 PROCEDURE — 3074F SYST BP LT 130 MM HG: CPT | Performed by: FAMILY MEDICINE

## 2024-04-09 NOTE — PROGRESS NOTES
Mariano Demarco is a 43 year old male.  Chief Complaint   Patient presents with    Physical     HPI:   Mariano Demarco is a 43 year old male with no significant past medical history seen for his annual physical.  Vegetarian diet and has not been exercising since his hernia repair in December. Just started doing yoga again.    Wife states has noticed that he is not as motivated to do things and not as confident, poor appetite and is concerned about his weight, patient states does not like his current job also feels stressed as they have had no luck with getting pregnant and that has been a stressor. Wife states does not have a social life since covid.    PETERSON-7 Scale       Feeling nervous, anxious, or on edge: Several days  Not being able to stop or control worrying: Several days  Worrying too much about different things   : Several days  Trouble relaxing: Several days  Being so restless that it's hard to sit still: Not at all  Becoming easily annoyed or irritable: Not at all  Feeling afraid as if something awful might happen: Several days    PETERSON 7 Total Score: 5            PHQ9 Scale     1. Little interest or pleasure in doing things: More than half the days  2. Feeling down, depressed, or hopeless: Several days  3. Trouble falling or staying asleep, or sleeping too much: Several days  4. Feeling tired or having little energy: More than half the days  5. Poor appetite or overeating: Not at all  6. Feeling bad about yourself - or that you are a failure or have let yourself or your family down: Several days  7. Trouble concentrating on things, such as reading the newspaper or watching television: Several days  8. Moving or speaking so slowly that other people could have noticed. Or the opposite - being so fidgety or restless that you have been moving around a lot more than usual: Not at all  9. Thoughts that you would be better off dead, or of hurting yourself in some way: Not at all  PHQ-9 TOTAL SCORE: 8            Complaining of hands and feet feeling cold for the past 6 months.    PAST MEDICAL HISTORY:     Past Medical History:   Diagnosis Date    Back problem     Degenerative arthritis of cervical spine     Problems with swallowing     at times    Visual impairment      PAST SURGICAL HISTORY:     Past Surgical History:   Procedure Laterality Date    HERNIA SURGERY  12/18/2023    LAPAROSCOPIC INGUINAL HERNIA REPAIR Bilateral 12/18/2023     ALLERGY:   No Known Allergies  MEDICATIONS:     Current Outpatient Medications   Medication Sig Dispense Refill    Multiple Vitamins-Minerals (MULTI-VITAMIN/MINERALS) Oral Tab Take 1 tablet by mouth daily.      Omega-3 Fatty Acids (FISH OIL OR) Take by mouth.       FAMILY HISTORY:     Family History   Problem Relation Age of Onset    Diabetes Maternal Grandfather     Heart Disease Father     Cancer Father         Prostrate cancer     SOCIAL HISTORY:     Social History     Socioeconomic History    Marital status:    Occupational History    Occupation: IT   Tobacco Use    Smoking status: Never    Smokeless tobacco: Never   Vaping Use    Vaping Use: Never used   Substance and Sexual Activity    Alcohol use: Not Currently     Comment: whisky/rare 1 time a month couple glasses.    Drug use: No    Sexual activity: Yes     Partners: Female   Other Topics Concern    Caffeine Concern No    Exercise Yes   Social History Narrative    vegitarian     REVIEW OF SYSTEMS:   Review of Systems   Constitutional:  Negative for appetite change, fatigue, fever and unexpected weight change.   HENT:  Negative for congestion, ear pain, hearing loss and sore throat.    Eyes:  Negative for discharge, redness and visual disturbance.   Respiratory:  Negative for cough, chest tightness and shortness of breath.    Cardiovascular:  Negative for chest pain and palpitations.   Gastrointestinal:  Negative for abdominal pain, blood in stool, constipation and nausea.   Endocrine: Negative for cold intolerance, heat  intolerance and polyuria.   Genitourinary:  Negative for difficulty urinating, dysuria, frequency and urgency.   Musculoskeletal:  Negative for arthralgias, gait problem, joint swelling and myalgias.   Skin:  Negative for rash.   Allergic/Immunologic: Negative for food allergies.   Neurological:  Negative for dizziness, weakness, numbness and headaches.   Hematological:  Negative for adenopathy. Does not bruise/bleed easily.   Psychiatric/Behavioral:  Negative for dysphoric mood and sleep disturbance. The patient is not nervous/anxious.       PHYSICAL EXAM:   /60   Pulse 63   Temp 98 °F (36.7 °C) (Temporal)   Resp 18   Ht 5' 7\" (1.702 m)   Wt 135 lb (61.2 kg)   SpO2 98%   BMI 21.14 kg/m²     Physical Exam  Constitutional:       General: He is not in acute distress.     Appearance: Normal appearance. He is well-developed and normal weight.   HENT:      Head: Normocephalic and atraumatic.      Right Ear: Tympanic membrane and external ear normal.      Left Ear: Tympanic membrane and external ear normal.      Nose: Nose normal.      Mouth/Throat:      Mouth: Mucous membranes are moist.      Pharynx: Oropharynx is clear.   Eyes:      Extraocular Movements: Extraocular movements intact.      Conjunctiva/sclera: Conjunctivae normal.      Pupils: Pupils are equal, round, and reactive to light.   Neck:      Thyroid: No thyromegaly.   Cardiovascular:      Rate and Rhythm: Normal rate and regular rhythm.      Pulses: Normal pulses.      Heart sounds: Normal heart sounds. No murmur heard.  Pulmonary:      Effort: Pulmonary effort is normal. No respiratory distress.      Breath sounds: Normal breath sounds.   Chest:      Chest wall: No tenderness.   Abdominal:      General: Bowel sounds are normal. There is no distension.      Palpations: Abdomen is soft. There is no hepatomegaly, splenomegaly or mass.      Tenderness: There is no abdominal tenderness.      Hernia: No hernia is present.   Musculoskeletal:          General: Normal range of motion.      Cervical back: Normal range of motion and neck supple.      Right lower leg: No edema.      Left lower leg: No edema.   Lymphadenopathy:      Cervical: No cervical adenopathy.   Skin:     General: Skin is warm.      Findings: No lesion or rash.   Neurological:      General: No focal deficit present.      Mental Status: He is alert and oriented to person, place, and time.      Deep Tendon Reflexes: Reflexes are normal and symmetric.   Psychiatric:         Attention and Perception: Attention and perception normal.         Mood and Affect: Mood normal. Affect is flat.         Speech: Speech normal.         Behavior: Behavior normal.         Thought Content: Thought content normal.         Cognition and Memory: Cognition normal.         Judgment: Judgment normal.        ASSESSMENT AND PLAN:   Mariano was seen today for physical.    Diagnoses and all orders for this visit:    Routine general medical examination at a health care facility  -     Assay, Thyroid Stim Hormone  -     Lipid Panel  -     Comp Metabolic Panel (14)  -     CBC With Differential With Platelet    Vitamin D deficiency  -     VITAMIN D, 25-HYDROXY [14558][Q]    Vitamin B12 deficiency  -     VITAMIN B12 [927][Q]    Mild depression  -     Luis Enrique Marcelo Navigator  -     encouraged patient to schedule an appointment with a therapist  -     advised to exercise, go out and meet friend or socialize more  -     advised to look for a different job or another career path in something that he is interested    PETERSON (generalized anxiety disorder)  -     Luis Enrique Morgan    Screening for prostate cancer  -     PSA, TOTAL W REFLEX TO PSA, FREE [74848][Q]    Family history of prostate cancer  -     PSA, TOTAL W REFLEX TO PSA, FREE [11483][Q]    Cold extremities      - advised will review labs.    Low weight      - reassures patient weight is stable      - increase protein in diet and strength train.    Age appropriate anticipatory  guidance reviewed  Health Maintenance   Topic Date Due    COVID-19 Vaccine (3 - 2023-24 season) 09/01/2023    Annual Physical  03/07/2024    Influenza Vaccine (Season Ended) 10/01/2024    DTaP,Tdap,and Td Vaccines (2 - Td or Tdap) 10/26/2025    Annual Depression Screening  Completed    Pneumococcal Vaccine: Birth to 64yrs  Aged Out        The 21st Century Cures Act makes medical notes like these available to patients in the interest of transparency. Please be advised this is a medical document. Medical documents are intended to carry relevant information, facts as evident, and the clinical opinion of the practitioner. The medical note is intended as peer to peer communication and may appear blunt or direct. It is written in medical language and may contain abbreviations or verbiage that are unfamiliar.

## 2024-04-09 NOTE — PATIENT INSTRUCTIONS
Juany Chaney   [address]    [facility] [address]   Phone: [phone]     [facility]   [address]   [place] [address]   Phone: [phone]     Abi Lee   [facility]   [address], [address]   [place] [address] [phone]       [facility]   [address] [place] [address]   Phone: [phone]     Abby Diaz   [facility]   [address]   [place] [address] [phone]

## 2024-04-12 ENCOUNTER — PATIENT MESSAGE (OUTPATIENT)
Dept: FAMILY MEDICINE CLINIC | Facility: CLINIC | Age: 44
End: 2024-04-12

## 2024-04-15 ENCOUNTER — TELEPHONE (OUTPATIENT)
Age: 44
End: 2024-04-15

## 2024-04-15 LAB
ABSOLUTE BASOPHILS: 41 CELLS/UL (ref 0–200)
ABSOLUTE EOSINOPHILS: 168 CELLS/UL (ref 15–500)
ABSOLUTE LYMPHOCYTES: 1169 CELLS/UL (ref 850–3900)
ABSOLUTE MONOCYTES: 299 CELLS/UL (ref 200–950)
ABSOLUTE NEUTROPHILS: 2423 CELLS/UL (ref 1500–7800)
ALBUMIN/GLOBULIN RATIO: 2 (CALC) (ref 1–2.5)
ALBUMIN: 4.5 G/DL (ref 3.6–5.1)
ALKALINE PHOSPHATASE: 51 U/L (ref 36–130)
ALT: 14 U/L (ref 9–46)
AST: 19 U/L (ref 10–40)
BASOPHILS: 1 %
BILIRUBIN, TOTAL: 2 MG/DL (ref 0.2–1.2)
BUN: 10 MG/DL (ref 7–25)
CALCIUM: 10 MG/DL (ref 8.6–10.3)
CARBON DIOXIDE: 28 MMOL/L (ref 20–32)
CHLORIDE: 105 MMOL/L (ref 98–110)
CHOL/HDLC RATIO: 2.9 (CALC)
CHOLESTEROL, TOTAL: 186 MG/DL
CREATININE: 0.81 MG/DL (ref 0.6–1.29)
EGFR: 114 ML/MIN/1.73M2
EOSINOPHILS: 4.1 %
GLOBULIN: 2.3 G/DL (CALC) (ref 1.9–3.7)
GLUCOSE: 87 MG/DL (ref 65–99)
HDL CHOLESTEROL: 64 MG/DL
HEMATOCRIT: 42.5 % (ref 38.5–50)
HEMOGLOBIN: 13.9 G/DL (ref 13.2–17.1)
LDL-CHOLESTEROL: 107 MG/DL (CALC)
LYMPHOCYTES: 28.5 %
MCH: 30.5 PG (ref 27–33)
MCHC: 32.7 G/DL (ref 32–36)
MCV: 93.4 FL (ref 80–100)
MONOCYTES: 7.3 %
MPV: 11.3 FL (ref 7.5–12.5)
NEUTROPHILS: 59.1 %
NON-HDL CHOLESTEROL: 122 MG/DL (CALC)
PLATELET COUNT: 194 THOUSAND/UL (ref 140–400)
POTASSIUM: 4 MMOL/L (ref 3.5–5.3)
PROTEIN, TOTAL: 6.8 G/DL (ref 6.1–8.1)
RDW: 12.4 % (ref 11–15)
RED BLOOD CELL COUNT: 4.55 MILLION/UL (ref 4.2–5.8)
SODIUM: 140 MMOL/L (ref 135–146)
TOTAL PSA: 0.6 NG/ML
TRIGLYCERIDES: 64 MG/DL
TSH: 2.88 MIU/L (ref 0.4–4.5)
VITAMIN B12: 438 PG/ML (ref 200–1100)
VITAMIN D, 25-OH, TOTAL: 41 NG/ML (ref 30–100)
WHITE BLOOD CELL COUNT: 4.1 THOUSAND/UL (ref 3.8–10.8)

## 2024-04-15 NOTE — TELEPHONE ENCOUNTER
From: Mariano Demarco  To: Regina Crowell  Sent: 4/12/2024 12:51 PM CDT  Subject: Checking on the blood work order    Hi Doctor,    I don't see orders from my last Annual physical visit.   Am I good to show up to the diagnostic center and would they have order in their system?  If not, then can the order be submitted today?    Thank you,  Mariano

## 2024-04-18 ENCOUNTER — PATIENT MESSAGE (OUTPATIENT)
Dept: FAMILY MEDICINE CLINIC | Facility: CLINIC | Age: 44
End: 2024-04-18

## 2024-04-24 ENCOUNTER — TELEPHONE (OUTPATIENT)
Age: 44
End: 2024-04-24

## 2024-04-24 NOTE — TELEPHONE ENCOUNTER
Roddy,   Thank you for speaking with me today. Below are the behavioral health providers I think might be a good fit and that are showing in network with your insurance. Please call the provider directly to schedule an appointment. If distance is a concern please inquire on virtual service options.     [facility]   [address] [place] [address]  Phone: Phone: [phone]  [email or web address]    [facility]  Marissa Monsalve  [address]   [address]   [facility] [address]  Phone: [phone]  [email or web address]    [facility]  Anh Hartmann   [address],  [place], [place], [address]  Phone: [phone]  [email or web address]    [facility]  Luanne Monge   [address] [place] [address] [address]  [place], [place] [address]  Phone: [phone]  [email or web address]    [facility]   Timothy Brooks  [address],   [place], [place], [address]  Phone: [phone]  Deirdre Brooks LCSW - [facility] ([employer])    If any safety concerns arise it is advised to call 911, go to the nearest emergency room. [facility] crisis walk-in facility is located at [address] in [place]. Contact Number for [facility] is [phone].      Warm regards,     Alyssa Bay LCPC  (she/her/hers)  Lead Patient Care Navigator Mental Health   [facility]    [email or web address]  [phone]  work

## 2025-06-24 ENCOUNTER — OFFICE VISIT (OUTPATIENT)
Dept: FAMILY MEDICINE CLINIC | Facility: CLINIC | Age: 45
End: 2025-06-24
Payer: COMMERCIAL

## 2025-06-24 VITALS
SYSTOLIC BLOOD PRESSURE: 124 MMHG | DIASTOLIC BLOOD PRESSURE: 80 MMHG | TEMPERATURE: 98 F | OXYGEN SATURATION: 98 % | HEART RATE: 80 BPM | HEIGHT: 67 IN | RESPIRATION RATE: 18 BRPM | BODY MASS INDEX: 24.64 KG/M2 | WEIGHT: 157 LBS

## 2025-06-24 DIAGNOSIS — Z12.5 SCREENING FOR PROSTATE CANCER: ICD-10-CM

## 2025-06-24 DIAGNOSIS — S92.901D CLOSED FRACTURE OF RIGHT FOOT WITH ROUTINE HEALING, SUBSEQUENT ENCOUNTER: ICD-10-CM

## 2025-06-24 DIAGNOSIS — E55.9 VITAMIN D DEFICIENCY: ICD-10-CM

## 2025-06-24 DIAGNOSIS — Z00.00 ROUTINE GENERAL MEDICAL EXAMINATION AT A HEALTH CARE FACILITY: Primary | ICD-10-CM

## 2025-06-24 DIAGNOSIS — E53.8 VITAMIN B12 DEFICIENCY: ICD-10-CM

## 2025-06-24 DIAGNOSIS — L23.9 ALLERGIC CONTACT DERMATITIS OF SCALP: ICD-10-CM

## 2025-06-24 PROCEDURE — 99396 PREV VISIT EST AGE 40-64: CPT | Performed by: FAMILY MEDICINE

## 2025-06-24 PROCEDURE — 99213 OFFICE O/P EST LOW 20 MIN: CPT | Performed by: FAMILY MEDICINE

## 2025-06-24 RX ORDER — FLUOCINOLONE ACETONIDE 0.1 MG/ML
1 SOLUTION TOPICAL 2 TIMES DAILY
Qty: 60 ML | Refills: 0 | Status: SHIPPED | OUTPATIENT
Start: 2025-06-24

## 2025-06-24 NOTE — PROGRESS NOTES
Family Medicine Progress Note    Mariano Demarco is a 44 year old male.  ASSESSMENT AND PLAN:  Mariano was seen today for physical and other.    Diagnoses and all orders for this visit:    Routine general medical examination at a health care facility  -     CBC With Differential With Platelet  -     Comp Metabolic Panel (14)  -     Lipid Panel  -     Assay, Thyroid Stim Hormone    Screening for prostate cancer  -     PSA, TOTAL WITH REFLEX TO PSA, FREE    Vitamin D deficiency  -     Vitamin D, 25-Hydroxy    Vitamin B12 deficiency  -     Vitamin B12    Allergic contact dermatitis of scalp  Itchy scalp patches consistent with dermatitis. Discussed fluocinolone lotion treatment.  - Prescribe fluocinolone lotion to apply on the scalp twice daily for two weeks.  - Advise to apply the lotion on the ear if affected.  - Consider referral to a dermatologist if symptoms do not improve.  -     Fluocinolone Acetonide 0.01 % External Solution; Apply 1 Application topically 2 (two) times daily.    Closed fracture of right foot with routine healing, subsequent encounter      Age appropriate anticipatory guidance reviewed  Health Maintenance   Topic Date Due    Annual Physical  04/09/2025    COVID-19 Vaccine (3 - 2024-25 season) 07/24/2025 (Originally 9/1/2024)    Influenza Vaccine (Season Ended) 10/01/2025    DTaP,Tdap,and Td Vaccines (2 - Td or Tdap) 10/26/2025    Annual Depression Screening  Completed    Pneumococcal Vaccine: Birth to 50yrs  Aged Out    Meningococcal B Vaccine  Aged Out         The patient indicates understanding of these issues and agrees to the plan.  Follow-Up: The patient is asked to return in Return in about 1 year (around 6/24/2026) for annual.    Regina Crowell MD        Chief Complaint   Patient presents with    Physical        The following individual(s) verbally consented to be recorded using ambient AI listening technology and understand that they can each withdraw  their consent to this listening technology at any point by asking the clinician to turn off or pause the recording:    Patient name: Mariano Demarco    HPI:   Mariano Demarco is a 44 year old male.  History of Present Illness  Mariano Demarco is a 44 year old male who presents for his routine annual physical.    Approximately two weeks ago, he sustained a fracture in his foot while playing pickleball. He twisted his foot and fell, leading to initial swelling and pain. These symptoms have subsided over the past couple of weeks. He visited a doctor about a month and a half ago, who confirmed the fracture and provided a boot for immobilization. He has been wearing the boot for about six weeks and removes it at night.    He has been experiencing dry patches on his scalp for a couple of months, which are sometimes itchy and located on the scalp and ear. He has not been using sunscreen on his scalp when outdoors.    He notes gaining some weight recently, which he attributes to a more relaxed lifestyle and eating habits.    PAST MEDICAL HISTORY:   Past Medical History[1]  PAST SURGICAL HISTORY:   Past Surgical History[2]  ALLERGY:   Allergies[3]  MEDICATIONS:   Current Medications[4]  FAMILY HISTORY:   Family History[5]  SOCIAL HISTORY:   Short Social Hx on File[6]  REVIEW OF SYSTEMS:   Review of Systems   Constitutional:  Negative for appetite change, fatigue, fever and unexpected weight change.   HENT:  Negative for congestion, ear pain, hearing loss and sore throat.    Eyes:  Negative for discharge, redness and visual disturbance.   Respiratory:  Negative for cough, chest tightness and shortness of breath.    Cardiovascular:  Negative for chest pain and palpitations.   Gastrointestinal:  Negative for abdominal pain, blood in stool, constipation and nausea.   Endocrine: Negative for cold intolerance, heat intolerance and polyuria.   Genitourinary:  Negative for difficulty urinating, dysuria, frequency and urgency.    Musculoskeletal:  Positive for back pain (occasional). Negative for arthralgias, gait problem, joint swelling and myalgias.   Skin:  Positive for rash.   Allergic/Immunologic: Negative for food allergies.   Neurological:  Negative for dizziness, weakness, numbness and headaches.   Hematological:  Negative for adenopathy. Does not bruise/bleed easily.   Psychiatric/Behavioral:  Negative for dysphoric mood and sleep disturbance. The patient is not nervous/anxious.         PHYSICAL EXAM:   /80   Pulse 80   Temp 98.2 °F (36.8 °C) (Temporal)   Resp 18   Ht 5' 7\" (1.702 m)   Wt 157 lb (71.2 kg)   SpO2 98%   BMI 24.59 kg/m²     Physical Exam  Constitutional:       General: He is not in acute distress.     Appearance: Normal appearance. He is well-developed and normal weight.   HENT:      Head: Normocephalic and atraumatic.      Right Ear: Tympanic membrane, ear canal and external ear normal.      Left Ear: Tympanic membrane, ear canal and external ear normal.      Nose: Nose normal.      Mouth/Throat:      Mouth: Mucous membranes are moist.      Pharynx: Oropharynx is clear.   Eyes:      Extraocular Movements: Extraocular movements intact.      Conjunctiva/sclera: Conjunctivae normal.      Pupils: Pupils are equal, round, and reactive to light.   Neck:      Thyroid: No thyromegaly.   Cardiovascular:      Rate and Rhythm: Normal rate and regular rhythm.      Pulses: Normal pulses.      Heart sounds: Normal heart sounds. No murmur heard.  Pulmonary:      Effort: Pulmonary effort is normal. No respiratory distress.      Breath sounds: Normal breath sounds.   Chest:      Chest wall: No tenderness.   Abdominal:      General: Bowel sounds are normal. There is no distension.      Palpations: Abdomen is soft. There is no hepatomegaly, splenomegaly or mass.      Tenderness: There is no abdominal tenderness.      Hernia: No hernia is present.   Musculoskeletal:         General: Normal range of motion.      Cervical  back: Normal range of motion and neck supple.      Right lower leg: No edema.      Left lower leg: No edema.   Feet:      Comments: Right foot in boot  Lymphadenopathy:      Cervical: No cervical adenopathy.   Skin:     General: Skin is warm.      Findings: Rash (dry erythematous patches on scalp) present.   Neurological:      General: No focal deficit present.      Mental Status: He is alert and oriented to person, place, and time.      Cranial Nerves: No cranial nerve deficit.      Sensory: No sensory deficit.      Motor: No weakness.      Coordination: Coordination normal.      Gait: Gait normal.      Deep Tendon Reflexes: Reflexes are normal and symmetric. Reflexes normal.   Psychiatric:         Mood and Affect: Mood normal.         Behavior: Behavior normal.         Thought Content: Thought content normal.         Judgment: Judgment normal.           The 21st Century Cures Act makes medical notes like these available to patients in the interest of transparency. Please be advised this is a medical document. Medical documents are intended to carry relevant information, facts as evident, and the clinical opinion of the practitioner. The medical note is intended as peer to peer communication and may appear blunt or direct. It is written in medical language and may contain abbreviations or verbiage that are unfamiliar.     Regina Crowell MD             [1]   Past Medical History:   Back problem    Degenerative arthritis of cervical spine    Problems with swallowing    at times    Visual impairment   [2]   Past Surgical History:  Procedure Laterality Date    Hernia surgery  12/18/2023    Laparoscopic inguinal hernia repair Bilateral 12/18/2023   [3] No Known Allergies  [4]   Current Outpatient Medications   Medication Sig Dispense Refill    Multiple Vitamins-Minerals (MULTI-VITAMIN/MINERALS) Oral Tab Take 1 tablet by mouth daily.      Omega-3 Fatty Acids (FISH OIL OR) Take by mouth.     [5]   Family  History  Problem Relation Age of Onset    Diabetes Maternal Grandfather     Heart Disease Father     Cancer Father         Prostrate cancer    Prostate Cancer Father    [6]   Social History  Socioeconomic History    Marital status:    Occupational History    Occupation: IT   Tobacco Use    Smoking status: Never    Smokeless tobacco: Never   Vaping Use    Vaping status: Never Used   Substance and Sexual Activity    Alcohol use: Not Currently     Comment: whisky/rare 1 time a month couple glasses.    Drug use: No    Sexual activity: Yes     Partners: Female   Other Topics Concern    Caffeine Concern No    Exercise Yes   Social History Narrative    vegitarian     Social Drivers of Health     Food Insecurity: No Food Insecurity (6/24/2025)    NCSS - Food Insecurity     Worried About Running Out of Food in the Last Year: No     Ran Out of Food in the Last Year: No   Transportation Needs: No Transportation Needs (6/24/2025)    NCSS - Transportation     Lack of Transportation: No   Housing Stability: Not At Risk (6/24/2025)    NCSS - Housing/Utilities     Has Housing: Yes     Worried About Losing Housing: No     Unable to Get Utilities: No

## 2025-06-27 LAB
ABSOLUTE BASOPHILS: 51 CELLS/UL (ref 0–200)
ABSOLUTE EOSINOPHILS: 311 CELLS/UL (ref 15–500)
ABSOLUTE LYMPHOCYTES: 1622 CELLS/UL (ref 850–3900)
ABSOLUTE MONOCYTES: 428 CELLS/UL (ref 200–950)
ABSOLUTE NEUTROPHILS: 2688 CELLS/UL (ref 1500–7800)
ALBUMIN/GLOBULIN RATIO: 1.7 (CALC) (ref 1–2.5)
ALBUMIN: 4.5 G/DL (ref 3.6–5.1)
ALKALINE PHOSPHATASE: 54 U/L (ref 36–130)
ALT: 15 U/L (ref 9–46)
AST: 22 U/L (ref 10–40)
BASOPHILS: 1 %
BILIRUBIN, TOTAL: 1.4 MG/DL (ref 0.2–1.2)
BUN: 13 MG/DL (ref 7–25)
CALCIUM: 10 MG/DL (ref 8.6–10.3)
CARBON DIOXIDE: 30 MMOL/L (ref 20–32)
CHLORIDE: 104 MMOL/L (ref 98–110)
CHOL/HDLC RATIO: 3.6 (CALC)
CHOLESTEROL, TOTAL: 195 MG/DL
CREATININE: 0.88 MG/DL (ref 0.6–1.29)
EGFR: 110 ML/MIN/1.73M2
EOSINOPHILS: 6.1 %
GLOBULIN: 2.6 G/DL (CALC) (ref 1.9–3.7)
GLUCOSE: 87 MG/DL (ref 65–99)
HDL CHOLESTEROL: 54 MG/DL
HEMATOCRIT: 46.6 % (ref 38.5–50)
HEMOGLOBIN: 15.3 G/DL (ref 13.2–17.1)
LDL-CHOLESTEROL: 122 MG/DL (CALC)
LYMPHOCYTES: 31.8 %
MCH: 30.8 PG (ref 27–33)
MCHC: 32.8 G/DL (ref 32–36)
MCV: 93.8 FL (ref 80–100)
MONOCYTES: 8.4 %
MPV: 11.1 FL (ref 7.5–12.5)
NEUTROPHILS: 52.7 %
NON-HDL CHOLESTEROL: 141 MG/DL (CALC)
PLATELET COUNT: 193 THOUSAND/UL (ref 140–400)
POTASSIUM: 4.5 MMOL/L (ref 3.5–5.3)
PROTEIN, TOTAL: 7.1 G/DL (ref 6.1–8.1)
RDW: 12.4 % (ref 11–15)
RED BLOOD CELL COUNT: 4.97 MILLION/UL (ref 4.2–5.8)
SODIUM: 142 MMOL/L (ref 135–146)
TOTAL PSA: 0.7 NG/ML
TRIGLYCERIDES: 90 MG/DL
TSH: 3.19 MIU/L (ref 0.4–4.5)
VITAMIN B12: 281 PG/ML (ref 200–1100)
VITAMIN D, 25-OH, TOTAL: 32 NG/ML (ref 30–100)
WHITE BLOOD CELL COUNT: 5.1 THOUSAND/UL (ref 3.8–10.8)

## 2025-08-22 ENCOUNTER — PATIENT MESSAGE (OUTPATIENT)
Dept: FAMILY MEDICINE CLINIC | Facility: CLINIC | Age: 45
End: 2025-08-22

## 2025-08-22 DIAGNOSIS — L23.9 ALLERGIC CONTACT DERMATITIS OF SCALP: Primary | ICD-10-CM

## (undated) DEVICE — ARM DRAPE

## (undated) DEVICE — ADHESIVE SKIN TOP FOR WND CLSR DERMBND ADV

## (undated) DEVICE — TRAY CATH 16FR F INCL BARDX IC COMPLT CARE

## (undated) DEVICE — COLUMN DRAPE

## (undated) DEVICE — SKN PREP SPNG STKS PVP PNT STR: Brand: MEDLINE INDUSTRIES, INC.

## (undated) DEVICE — TIP COVER ACCESSORY

## (undated) DEVICE — CANNULA SEAL

## (undated) DEVICE — ROBOTIC GENERAL: Brand: MEDLINE INDUSTRIES, INC.

## (undated) DEVICE — 40580 - THE PINK PAD - ADVANCED TRENDELENBURG POSITIONING KIT: Brand: 40580 - THE PINK PAD - ADVANCED TRENDELENBURG POSITIONING KIT

## (undated) DEVICE — STERILE DRAPE FOR USE WITH SITUATE ROOM SCANNER: Brand: SITUATE

## (undated) DEVICE — AIRSEAL BIFURCATED FILTERED TUBESET WITH ACTIVATED CHARCOAL FILTER: Brand: AIRSEAL

## (undated) DEVICE — ABSORBABLE WOUND CLOSURE DEVICE: Brand: V-LOC 90

## (undated) DEVICE — AIRSEAL 8 MM CANNULA CAP AND OBTURATOR WITH BLADELESS OPTICAL TIP COMPATIBLE WITH INTUITIVE DA VINCI XI AND DA VINCI X 8 MM INSTRUMENT CANNULA, STANDARD LENGTH: Brand: AIRSEAL

## (undated) DEVICE — LIGHT HANDLE

## (undated) DEVICE — SUTURE MCRYL SZ 4-0 L18IN ABSRB UD L19MM PS-2

## (undated) DEVICE — ENDOPATH ULTRA VERESS INSUFFLATION NEEDLES WITH LUER LOCK CONNECTORS: Brand: ENDOPATH

## (undated) DEVICE — DRAPE,TOP,102X53,STERILE: Brand: MEDLINE

## (undated) DEVICE — LAPAROVUE VISIBILITY SYSTEM LAPAROSCOPIC SOLUTIONS: Brand: LAPAROVUE

## (undated) DEVICE — CADIERE FORCEPS: Brand: ENDOWRIST

## (undated) DEVICE — MEGA SUTURECUT ND: Brand: ENDOWRIST

## (undated) DEVICE — STERILE POLYISOPRENE POWDER-FREE SURGICAL GLOVES: Brand: PROTEXIS

## (undated) DEVICE — BLADELESS OBTURATOR: Brand: WECK VISTA

## (undated) DEVICE — MONOPOLAR CURVED SCISSORS: Brand: ENDOWRIST

## (undated) NOTE — LETTER
24    Patient: Mariano Demarco  : 1980 Visit date: 1/3/2024    Dear  Regina Crowell MD    Thank you for referring Mariano Demarco to my practice.  Please find my assessment and plan below.    Assessment   1. Suprapubic abdominal pain    2. Status post insertion of Patel catheter          Plan     The patient is recovering nicely following robotic assisted bilateral inguinal hernia repair with mesh.    The anticipated postoperative recovery was discussed with the patient in detail.    Dietary, activity, and exercise recommendations along with restrictions were discussed with the patient during today's visit.    Wound care instructions were discussed during today's visit.    The patient will return to my attention on an as needed basis.    The patient is encouraged to continue seeing the primary care physician for ongoing medical needs.    The patient was given ample opportunity to ask questions.  The patient's questions were answered in detail and to the patient's satisfaction.  The patient voiced understanding of the postoperative care plan.             Sincerely,       Fab Allison MD   CC:   No Recipients

## (undated) NOTE — LETTER
23    Patient: Ramila Miles  : 1980 Visit date: 2023    Dear  Isis Kingsley MD    Thank you for referring Ramila Miles to my practice. Please find my assessment and plan below. Assessment   Bilateral incarcerated inguinal hernia  (primary encounter diagnosis)      Plan     The patient will be scheduled for robotic repair of incarcerated bilateral inguinal hernia with mesh. The finesse-operative care plan was discussed with the patient, who voices understanding. Activity and lifting recommendations were discussed in length. The risks, benefits, and alternatives to the procedure were explained to the patient. The risks explained include, but are not limited to, bleeding, infection, pain wound complications, recurrence, incorrect diagnosis, injury to adjacent organs and structures. We also discussed the possibile need for further therapeutic, diagnostic, or surgical intervention. The patient voiced understanding, and after all questions were answered to the patient's satisfaction, the patient provided willing and informed consent to proceed.       Sincerely,       Brandon Davenport MD   CC:   No Recipients

## (undated) NOTE — MR AVS SNAPSHOT
[address] [address] [phone]               Thank you for choosing us for your health care visit with Stephanie Pruitt MD.  We are glad to serve you and happy to provide you with this [facility] in [facility] and [facility]  [address] [facility], [address] [facility], [address] [place] [address]    [address]: [phone]    [address]a [address], [place] [address] · Take a deep breath. Breathe out, and lower your elbows toward your buttocks. Hold for 5 seconds, then return to starting position. · Repeat 3 times.        Date Last Reviewed: 8/16/2015  © 2106-2629 06 Johnson StreetRegis Note: Keep your shoulders on the floor. Don’t lift your chin as you turn your head. Date Last Reviewed: 8/16/2015  © 2179-6493 [address]. All rights reserved.  This information is not intended as a For medical emergencies, dial 911.            Visit [facility] online at  [email or web address]

## (undated) NOTE — MR AVS SNAPSHOT
[address]ro [address] [place] [id number] [phone]               Thank you for choosing us for your health care visit with Bri García MD.  We are glad to serve you and happy to provide you with this physician's office. At that time, you will be provided with any authorization numbers or be assured that none are required. You can then schedule your appointment.  Failure to obtain required authorization numbers can create reimbursement difficulties for y If self-care treatments aren’t helping relieve neck pain, your healthcare provider may suggest physical therapy. Physical therapy is done by a specialist trained to treat injuries.  Your physical therapist (PT) will teach you how to strengthen muscles, impr Apply topically. MyChart     Visit MyChart  You can access your MyChart to more actively manage your health care and view more details from this visit by going to [email or web address].   If you've recently had a stay at the [facility]